# Patient Record
Sex: FEMALE | Race: WHITE | Employment: OTHER | URBAN - METROPOLITAN AREA
[De-identification: names, ages, dates, MRNs, and addresses within clinical notes are randomized per-mention and may not be internally consistent; named-entity substitution may affect disease eponyms.]

---

## 2021-07-31 ENCOUNTER — APPOINTMENT (OUTPATIENT)
Dept: GENERAL RADIOLOGY | Facility: CLINIC | Age: 76
DRG: 308 | End: 2021-07-31
Attending: FAMILY MEDICINE
Payer: COMMERCIAL

## 2021-07-31 ENCOUNTER — HOSPITAL ENCOUNTER (INPATIENT)
Facility: CLINIC | Age: 76
LOS: 2 days | Discharge: HOME | DRG: 308 | End: 2021-08-03
Attending: FAMILY MEDICINE | Admitting: PEDIATRICS
Payer: COMMERCIAL

## 2021-07-31 ENCOUNTER — APPOINTMENT (OUTPATIENT)
Dept: ULTRASOUND IMAGING | Facility: CLINIC | Age: 76
DRG: 308 | End: 2021-07-31
Attending: FAMILY MEDICINE
Payer: COMMERCIAL

## 2021-07-31 DIAGNOSIS — J96.01 ACUTE RESPIRATORY FAILURE WITH HYPOXIA AND HYPERCAPNIA (H): Primary | ICD-10-CM

## 2021-07-31 DIAGNOSIS — J96.21 ACUTE ON CHRONIC RESPIRATORY FAILURE WITH HYPOXIA AND HYPERCAPNIA (H): ICD-10-CM

## 2021-07-31 DIAGNOSIS — J96.02 ACUTE RESPIRATORY FAILURE WITH HYPOXIA AND HYPERCAPNIA (H): Primary | ICD-10-CM

## 2021-07-31 DIAGNOSIS — I89.0 LYMPHEDEMA: ICD-10-CM

## 2021-07-31 DIAGNOSIS — J45.51 SEVERE PERSISTENT ASTHMA WITH (ACUTE) EXACERBATION (H): ICD-10-CM

## 2021-07-31 DIAGNOSIS — G47.30 SLEEP APNEA, UNSPECIFIED TYPE: ICD-10-CM

## 2021-07-31 DIAGNOSIS — J96.22 ACUTE ON CHRONIC RESPIRATORY FAILURE WITH HYPOXIA AND HYPERCAPNIA (H): ICD-10-CM

## 2021-07-31 DIAGNOSIS — Z79.01 ANTICOAGULATED ON COUMADIN: ICD-10-CM

## 2021-07-31 DIAGNOSIS — I48.91 ATRIAL FIBRILLATION WITH RVR (H): ICD-10-CM

## 2021-07-31 DIAGNOSIS — M79.89 SWELLING OF LIMB: ICD-10-CM

## 2021-07-31 DIAGNOSIS — R65.10 SIRS (SYSTEMIC INFLAMMATORY RESPONSE SYNDROME) (H): ICD-10-CM

## 2021-07-31 DIAGNOSIS — J81.0 ACUTE PULMONARY EDEMA (H): ICD-10-CM

## 2021-07-31 DIAGNOSIS — E66.01 MORBID OBESITY (H): ICD-10-CM

## 2021-07-31 DIAGNOSIS — Z11.52 ENCOUNTER FOR SCREENING LABORATORY TESTING FOR SEVERE ACUTE RESPIRATORY SYNDROME CORONAVIRUS 2 (SARS-COV-2): ICD-10-CM

## 2021-07-31 PROBLEM — L03.116 CELLULITIS OF LEFT LOWER LEG: Status: ACTIVE | Noted: 2021-07-31

## 2021-07-31 PROBLEM — R06.03 ACUTE RESPIRATORY DISTRESS: Status: ACTIVE | Noted: 2021-07-31

## 2021-07-31 PROBLEM — L97.929 VENOUS ULCER OF LEFT LEG (H): Status: ACTIVE | Noted: 2021-07-31

## 2021-07-31 PROBLEM — N18.31 STAGE 3A CHRONIC KIDNEY DISEASE (H): Status: ACTIVE | Noted: 2021-07-31

## 2021-07-31 PROBLEM — I83.029 VENOUS ULCER OF LEFT LEG (H): Status: ACTIVE | Noted: 2021-07-31

## 2021-07-31 PROBLEM — E87.4 HYPERCAPNIA WITH MIXED ACID-BASE DISORDER: Status: ACTIVE | Noted: 2021-07-31

## 2021-07-31 PROBLEM — G47.33 OSA ON CPAP: Status: ACTIVE | Noted: 2021-07-31

## 2021-07-31 PROBLEM — R79.89 ELEVATED BRAIN NATRIURETIC PEPTIDE (BNP) LEVEL: Status: ACTIVE | Noted: 2021-07-31

## 2021-07-31 LAB
ALBUMIN SERPL-MCNC: 3.8 G/DL (ref 3.4–5)
ALBUMIN UR-MCNC: 30 MG/DL
ALP SERPL-CCNC: 109 U/L (ref 40–150)
ALT SERPL W P-5'-P-CCNC: 26 U/L (ref 0–50)
ANION GAP SERPL CALCULATED.3IONS-SCNC: 6 MMOL/L (ref 3–14)
APPEARANCE UR: CLEAR
APTT PPP: 39 SECONDS (ref 22–38)
AST SERPL W P-5'-P-CCNC: 18 U/L (ref 0–45)
BACTERIA #/AREA URNS HPF: ABNORMAL /HPF
BASE EXCESS BLDA CALC-SCNC: 3.8 MMOL/L (ref -9–1.8)
BASE EXCESS BLDV CALC-SCNC: 3.1 MMOL/L (ref -7.7–1.9)
BASOPHILS # BLD AUTO: 0 10E3/UL (ref 0–0.2)
BASOPHILS NFR BLD AUTO: 0 %
BILIRUB SERPL-MCNC: 0.8 MG/DL (ref 0.2–1.3)
BILIRUB UR QL STRIP: NEGATIVE
BUN SERPL-MCNC: 18 MG/DL (ref 7–30)
CALCIUM SERPL-MCNC: 8.8 MG/DL (ref 8.5–10.1)
CHLORIDE BLD-SCNC: 106 MMOL/L (ref 94–109)
CO2 SERPL-SCNC: 27 MMOL/L (ref 20–32)
COLOR UR AUTO: YELLOW
CREAT SERPL-MCNC: 1.06 MG/DL (ref 0.52–1.04)
CRP SERPL-MCNC: 7.5 MG/L (ref 0–8)
D DIMER PPP FEU-MCNC: 0.92 UG/ML FEU (ref 0–0.5)
EOSINOPHIL # BLD AUTO: 0.1 10E3/UL (ref 0–0.7)
EOSINOPHIL NFR BLD AUTO: 1 %
ERYTHROCYTE [DISTWIDTH] IN BLOOD BY AUTOMATED COUNT: 15 % (ref 10–15)
GFR SERPL CREATININE-BSD FRML MDRD: 51 ML/MIN/1.73M2
GLUCOSE BLD-MCNC: 112 MG/DL (ref 70–99)
GLUCOSE UR STRIP-MCNC: NEGATIVE MG/DL
HCO3 BLD-SCNC: 28 MMOL/L (ref 21–28)
HCO3 BLDV-SCNC: 30 MMOL/L (ref 21–28)
HCT VFR BLD AUTO: 39.5 % (ref 35–47)
HGB BLD-MCNC: 12.3 G/DL (ref 11.7–15.7)
HGB UR QL STRIP: NEGATIVE
IMM GRANULOCYTES # BLD: 0 10E3/UL
IMM GRANULOCYTES NFR BLD: 0 %
INR PPP: 2.93 (ref 0.85–1.15)
KETONES UR STRIP-MCNC: NEGATIVE MG/DL
LACTATE SERPL-SCNC: 1.3 MMOL/L (ref 0.7–2)
LACTATE SERPL-SCNC: 1.8 MMOL/L (ref 0.7–2)
LEUKOCYTE ESTERASE UR QL STRIP: ABNORMAL
LYMPHOCYTES # BLD AUTO: 0.7 10E3/UL (ref 0.8–5.3)
LYMPHOCYTES NFR BLD AUTO: 8 %
MAGNESIUM SERPL-MCNC: 1.5 MG/DL (ref 1.6–2.3)
MCH RBC QN AUTO: 30.2 PG (ref 26.5–33)
MCHC RBC AUTO-ENTMCNC: 31.1 G/DL (ref 31.5–36.5)
MCV RBC AUTO: 97 FL (ref 78–100)
MONOCYTES # BLD AUTO: 0.5 10E3/UL (ref 0–1.3)
MONOCYTES NFR BLD AUTO: 5 %
MUCOUS THREADS #/AREA URNS LPF: PRESENT /LPF
NEUTROPHILS # BLD AUTO: 7.9 10E3/UL (ref 1.6–8.3)
NEUTROPHILS NFR BLD AUTO: 86 %
NITRATE UR QL: NEGATIVE
NRBC # BLD AUTO: 0 10E3/UL
NRBC BLD AUTO-RTO: 0 /100
NT-PROBNP SERPL-MCNC: 2221 PG/ML (ref 0–900)
O2/TOTAL GAS SETTING VFR VENT: 21 %
O2/TOTAL GAS SETTING VFR VENT: 28 %
OXYHGB MFR BLD: 91 % (ref 92–100)
PCO2 BLD: 42 MM HG (ref 35–45)
PCO2 BLDV: 57 MM HG (ref 40–50)
PH BLD: 7.44 [PH] (ref 7.35–7.45)
PH BLDV: 7.34 [PH] (ref 7.32–7.43)
PH UR STRIP: 7 [PH] (ref 5–7)
PLATELET # BLD AUTO: 187 10E3/UL (ref 150–450)
PO2 BLD: 71 MM HG (ref 80–105)
PO2 BLDV: 25 MM HG (ref 25–47)
POTASSIUM BLD-SCNC: 4 MMOL/L (ref 3.4–5.3)
PROCALCITONIN SERPL-MCNC: <0.05 NG/ML
PROT SERPL-MCNC: 7.1 G/DL (ref 6.8–8.8)
RBC # BLD AUTO: 4.07 10E6/UL (ref 3.8–5.2)
RBC URINE: 1 /HPF
SARS-COV-2 RNA RESP QL NAA+PROBE: NEGATIVE
SODIUM SERPL-SCNC: 139 MMOL/L (ref 133–144)
SP GR UR STRIP: 1.01 (ref 1–1.03)
SQUAMOUS EPITHELIAL: 2 /HPF
TROPONIN I SERPL-MCNC: <0.015 UG/L (ref 0–0.04)
TSH SERPL DL<=0.005 MIU/L-ACNC: 0.96 MU/L (ref 0.4–4)
UROBILINOGEN UR STRIP-MCNC: 2 MG/DL
WBC # BLD AUTO: 9.3 10E3/UL (ref 4–11)
WBC URINE: 6 /HPF

## 2021-07-31 PROCEDURE — 85730 THROMBOPLASTIN TIME PARTIAL: CPT | Performed by: FAMILY MEDICINE

## 2021-07-31 PROCEDURE — 250N000011 HC RX IP 250 OP 636: Performed by: FAMILY MEDICINE

## 2021-07-31 PROCEDURE — 84484 ASSAY OF TROPONIN QUANT: CPT | Performed by: FAMILY MEDICINE

## 2021-07-31 PROCEDURE — C9803 HOPD COVID-19 SPEC COLLECT: HCPCS | Performed by: FAMILY MEDICINE

## 2021-07-31 PROCEDURE — 99220 PR INITIAL OBSERVATION CARE,LEVEL III: CPT | Performed by: PEDIATRICS

## 2021-07-31 PROCEDURE — 87040 BLOOD CULTURE FOR BACTERIA: CPT | Performed by: FAMILY MEDICINE

## 2021-07-31 PROCEDURE — 82803 BLOOD GASES ANY COMBINATION: CPT | Performed by: FAMILY MEDICINE

## 2021-07-31 PROCEDURE — 84443 ASSAY THYROID STIM HORMONE: CPT | Performed by: FAMILY MEDICINE

## 2021-07-31 PROCEDURE — 96365 THER/PROPH/DIAG IV INF INIT: CPT

## 2021-07-31 PROCEDURE — 250N000013 HC RX MED GY IP 250 OP 250 PS 637: Performed by: FAMILY MEDICINE

## 2021-07-31 PROCEDURE — 250N000013 HC RX MED GY IP 250 OP 250 PS 637: Performed by: INTERNAL MEDICINE

## 2021-07-31 PROCEDURE — 94640 AIRWAY INHALATION TREATMENT: CPT

## 2021-07-31 PROCEDURE — 81001 URINALYSIS AUTO W/SCOPE: CPT | Performed by: FAMILY MEDICINE

## 2021-07-31 PROCEDURE — 250N000009 HC RX 250: Performed by: PEDIATRICS

## 2021-07-31 PROCEDURE — 84145 PROCALCITONIN (PCT): CPT | Performed by: PEDIATRICS

## 2021-07-31 PROCEDURE — 83735 ASSAY OF MAGNESIUM: CPT | Performed by: PEDIATRICS

## 2021-07-31 PROCEDURE — 250N000009 HC RX 250

## 2021-07-31 PROCEDURE — 93970 EXTREMITY STUDY: CPT

## 2021-07-31 PROCEDURE — 36415 COLL VENOUS BLD VENIPUNCTURE: CPT | Performed by: PEDIATRICS

## 2021-07-31 PROCEDURE — 96375 TX/PRO/DX INJ NEW DRUG ADDON: CPT | Performed by: FAMILY MEDICINE

## 2021-07-31 PROCEDURE — 93005 ELECTROCARDIOGRAM TRACING: CPT | Performed by: FAMILY MEDICINE

## 2021-07-31 PROCEDURE — 82805 BLOOD GASES W/O2 SATURATION: CPT | Performed by: PEDIATRICS

## 2021-07-31 PROCEDURE — 85379 FIBRIN DEGRADATION QUANT: CPT | Performed by: FAMILY MEDICINE

## 2021-07-31 PROCEDURE — 87635 SARS-COV-2 COVID-19 AMP PRB: CPT | Performed by: FAMILY MEDICINE

## 2021-07-31 PROCEDURE — G0378 HOSPITAL OBSERVATION PER HR: HCPCS

## 2021-07-31 PROCEDURE — 96374 THER/PROPH/DIAG INJ IV PUSH: CPT | Performed by: FAMILY MEDICINE

## 2021-07-31 PROCEDURE — 85025 COMPLETE CBC W/AUTO DIFF WBC: CPT | Performed by: FAMILY MEDICINE

## 2021-07-31 PROCEDURE — 36415 COLL VENOUS BLD VENIPUNCTURE: CPT | Performed by: FAMILY MEDICINE

## 2021-07-31 PROCEDURE — 85610 PROTHROMBIN TIME: CPT | Performed by: FAMILY MEDICINE

## 2021-07-31 PROCEDURE — 93010 ELECTROCARDIOGRAM REPORT: CPT | Performed by: FAMILY MEDICINE

## 2021-07-31 PROCEDURE — 250N000009 HC RX 250: Performed by: FAMILY MEDICINE

## 2021-07-31 PROCEDURE — 83880 ASSAY OF NATRIURETIC PEPTIDE: CPT | Performed by: FAMILY MEDICINE

## 2021-07-31 PROCEDURE — 83605 ASSAY OF LACTIC ACID: CPT | Performed by: FAMILY MEDICINE

## 2021-07-31 PROCEDURE — 250N000011 HC RX IP 250 OP 636: Performed by: INTERNAL MEDICINE

## 2021-07-31 PROCEDURE — 99285 EMERGENCY DEPT VISIT HI MDM: CPT | Mod: 25 | Performed by: FAMILY MEDICINE

## 2021-07-31 PROCEDURE — 71045 X-RAY EXAM CHEST 1 VIEW: CPT

## 2021-07-31 PROCEDURE — 96366 THER/PROPH/DIAG IV INF ADDON: CPT

## 2021-07-31 PROCEDURE — 86140 C-REACTIVE PROTEIN: CPT | Performed by: FAMILY MEDICINE

## 2021-07-31 PROCEDURE — 80053 COMPREHEN METABOLIC PANEL: CPT | Performed by: FAMILY MEDICINE

## 2021-07-31 PROCEDURE — 83605 ASSAY OF LACTIC ACID: CPT | Performed by: PEDIATRICS

## 2021-07-31 PROCEDURE — 250N000013 HC RX MED GY IP 250 OP 250 PS 637: Performed by: PEDIATRICS

## 2021-07-31 RX ORDER — SENNOSIDES 8.6 MG
2 TABLET ORAL DAILY PRN
COMMUNITY

## 2021-07-31 RX ORDER — FUROSEMIDE 10 MG/ML
60 INJECTION INTRAMUSCULAR; INTRAVENOUS ONCE
Status: COMPLETED | OUTPATIENT
Start: 2021-07-31 | End: 2021-07-31

## 2021-07-31 RX ORDER — METOPROLOL TARTRATE 1 MG/ML
5 INJECTION, SOLUTION INTRAVENOUS EVERY 5 MIN PRN
Status: COMPLETED | OUTPATIENT
Start: 2021-07-31 | End: 2021-07-31

## 2021-07-31 RX ORDER — NALOXONE HYDROCHLORIDE 0.4 MG/ML
0.4 INJECTION, SOLUTION INTRAMUSCULAR; INTRAVENOUS; SUBCUTANEOUS
Status: DISCONTINUED | OUTPATIENT
Start: 2021-07-31 | End: 2021-08-03 | Stop reason: HOSPADM

## 2021-07-31 RX ORDER — NALOXONE HYDROCHLORIDE 0.4 MG/ML
0.2 INJECTION, SOLUTION INTRAMUSCULAR; INTRAVENOUS; SUBCUTANEOUS
Status: DISCONTINUED | OUTPATIENT
Start: 2021-07-31 | End: 2021-08-03 | Stop reason: HOSPADM

## 2021-07-31 RX ORDER — ROPINIROLE 3 MG/1
3 TABLET, FILM COATED ORAL AT BEDTIME
COMMUNITY

## 2021-07-31 RX ORDER — ONDANSETRON 4 MG/1
4 TABLET, ORALLY DISINTEGRATING ORAL EVERY 6 HOURS PRN
Status: DISCONTINUED | OUTPATIENT
Start: 2021-07-31 | End: 2021-08-03 | Stop reason: HOSPADM

## 2021-07-31 RX ORDER — ASPIRIN 81 MG/1
81 TABLET ORAL DAILY
COMMUNITY

## 2021-07-31 RX ORDER — WARFARIN SODIUM 2 MG/1
2 TABLET ORAL
Status: COMPLETED | OUTPATIENT
Start: 2021-07-31 | End: 2021-07-31

## 2021-07-31 RX ORDER — DILTIAZEM HCL IN NACL,ISO-OSM 125 MG/125
5-15 PLASTIC BAG, INJECTION (ML) INTRAVENOUS CONTINUOUS
Status: DISCONTINUED | OUTPATIENT
Start: 2021-07-31 | End: 2021-08-01

## 2021-07-31 RX ORDER — DILTIAZEM HYDROCHLORIDE 5 MG/ML
INJECTION INTRAVENOUS
Status: COMPLETED
Start: 2021-07-31 | End: 2021-07-31

## 2021-07-31 RX ORDER — CYANOCOBALAMIN (VITAMIN B-12) 2500 MCG
2500 TABLET, SUBLINGUAL SUBLINGUAL DAILY
COMMUNITY

## 2021-07-31 RX ORDER — LIDOCAINE 40 MG/G
CREAM TOPICAL
Status: DISCONTINUED | OUTPATIENT
Start: 2021-07-31 | End: 2021-08-03 | Stop reason: HOSPADM

## 2021-07-31 RX ORDER — ROPINIROLE 0.25 MG/1
3 TABLET, FILM COATED ORAL AT BEDTIME
Status: DISCONTINUED | OUTPATIENT
Start: 2021-07-31 | End: 2021-08-03 | Stop reason: HOSPADM

## 2021-07-31 RX ORDER — METHYLPREDNISOLONE SODIUM SUCCINATE 125 MG/2ML
125 INJECTION, POWDER, LYOPHILIZED, FOR SOLUTION INTRAMUSCULAR; INTRAVENOUS ONCE
Status: COMPLETED | OUTPATIENT
Start: 2021-07-31 | End: 2021-07-31

## 2021-07-31 RX ORDER — ATORVASTATIN CALCIUM 40 MG/1
40 TABLET, FILM COATED ORAL DAILY
COMMUNITY

## 2021-07-31 RX ORDER — HYDROMORPHONE HYDROCHLORIDE 1 MG/ML
0.5 INJECTION, SOLUTION INTRAMUSCULAR; INTRAVENOUS; SUBCUTANEOUS
Status: DISCONTINUED | OUTPATIENT
Start: 2021-07-31 | End: 2021-07-31

## 2021-07-31 RX ORDER — MORPHINE SULFATE 2 MG/ML
2 INJECTION, SOLUTION INTRAMUSCULAR; INTRAVENOUS EVERY 4 HOURS PRN
Status: DISCONTINUED | OUTPATIENT
Start: 2021-07-31 | End: 2021-08-03 | Stop reason: HOSPADM

## 2021-07-31 RX ORDER — DILTIAZEM HYDROCHLORIDE 5 MG/ML
20 INJECTION INTRAVENOUS ONCE
Status: COMPLETED | OUTPATIENT
Start: 2021-07-31 | End: 2021-07-31

## 2021-07-31 RX ORDER — ASPIRIN 81 MG/1
81 TABLET ORAL DAILY
Status: DISCONTINUED | OUTPATIENT
Start: 2021-07-31 | End: 2021-08-03 | Stop reason: HOSPADM

## 2021-07-31 RX ORDER — METOPROLOL TARTRATE 50 MG
50 TABLET ORAL ONCE
Status: COMPLETED | OUTPATIENT
Start: 2021-07-31 | End: 2021-07-31

## 2021-07-31 RX ORDER — ALBUTEROL SULFATE 0.83 MG/ML
3 SOLUTION RESPIRATORY (INHALATION)
Status: DISCONTINUED | OUTPATIENT
Start: 2021-07-31 | End: 2021-08-02

## 2021-07-31 RX ORDER — HYDROCODONE BITARTRATE AND ACETAMINOPHEN 5; 325 MG/1; MG/1
1 TABLET ORAL EVERY 6 HOURS PRN
Status: DISCONTINUED | OUTPATIENT
Start: 2021-07-31 | End: 2021-08-03 | Stop reason: HOSPADM

## 2021-07-31 RX ORDER — METOPROLOL TARTRATE 50 MG
50 TABLET ORAL 2 TIMES DAILY
Status: DISCONTINUED | OUTPATIENT
Start: 2021-07-31 | End: 2021-08-03 | Stop reason: HOSPADM

## 2021-07-31 RX ORDER — IPRATROPIUM BROMIDE AND ALBUTEROL SULFATE 2.5; .5 MG/3ML; MG/3ML
3 SOLUTION RESPIRATORY (INHALATION) ONCE
Status: COMPLETED | OUTPATIENT
Start: 2021-07-31 | End: 2021-07-31

## 2021-07-31 RX ORDER — PREDNISONE 20 MG/1
40 TABLET ORAL
Status: DISCONTINUED | OUTPATIENT
Start: 2021-08-02 | End: 2021-08-03 | Stop reason: HOSPADM

## 2021-07-31 RX ORDER — LISINOPRIL 40 MG/1
40 TABLET ORAL DAILY
Status: ON HOLD | COMMUNITY
End: 2021-08-03

## 2021-07-31 RX ORDER — CEFAZOLIN SODIUM 1 G/3ML
1 INJECTION, POWDER, FOR SOLUTION INTRAMUSCULAR; INTRAVENOUS EVERY 8 HOURS
Status: DISCONTINUED | OUTPATIENT
Start: 2021-07-31 | End: 2021-08-02

## 2021-07-31 RX ORDER — ACETAMINOPHEN 325 MG/1
325 TABLET ORAL EVERY 4 HOURS PRN
Status: DISCONTINUED | OUTPATIENT
Start: 2021-07-31 | End: 2021-08-03 | Stop reason: HOSPADM

## 2021-07-31 RX ORDER — IPRATROPIUM BROMIDE AND ALBUTEROL SULFATE 2.5; .5 MG/3ML; MG/3ML
3 SOLUTION RESPIRATORY (INHALATION) EVERY 4 HOURS
Status: DISCONTINUED | OUTPATIENT
Start: 2021-07-31 | End: 2021-08-01

## 2021-07-31 RX ORDER — FLUTICASONE PROPIONATE 50 MCG
2 SPRAY, SUSPENSION (ML) NASAL DAILY
COMMUNITY

## 2021-07-31 RX ORDER — FUROSEMIDE 40 MG
40 TABLET ORAL DAILY
Status: ON HOLD | COMMUNITY
End: 2021-08-03

## 2021-07-31 RX ORDER — DILTIAZEM HYDROCHLORIDE 30 MG/1
30 TABLET, FILM COATED ORAL ONCE
Status: COMPLETED | OUTPATIENT
Start: 2021-07-31 | End: 2021-07-31

## 2021-07-31 RX ORDER — WARFARIN SODIUM 5 MG/1
5 TABLET ORAL DAILY
Status: ON HOLD | COMMUNITY
End: 2021-08-03

## 2021-07-31 RX ORDER — AMLODIPINE BESYLATE 5 MG/1
5 TABLET ORAL DAILY
Status: ON HOLD | COMMUNITY
End: 2021-08-03

## 2021-07-31 RX ORDER — PANTOPRAZOLE SODIUM 40 MG/1
40 TABLET, DELAYED RELEASE ORAL DAILY
Status: DISCONTINUED | OUTPATIENT
Start: 2021-07-31 | End: 2021-08-03 | Stop reason: HOSPADM

## 2021-07-31 RX ORDER — ONDANSETRON 2 MG/ML
4 INJECTION INTRAMUSCULAR; INTRAVENOUS EVERY 6 HOURS PRN
Status: DISCONTINUED | OUTPATIENT
Start: 2021-07-31 | End: 2021-08-03 | Stop reason: HOSPADM

## 2021-07-31 RX ORDER — LISINOPRIL 40 MG/1
40 TABLET ORAL DAILY
Status: DISCONTINUED | OUTPATIENT
Start: 2021-07-31 | End: 2021-08-02

## 2021-07-31 RX ORDER — DILTIAZEM HYDROCHLORIDE 30 MG/1
30 TABLET, FILM COATED ORAL EVERY 6 HOURS PRN
Status: ON HOLD | COMMUNITY
End: 2021-08-03

## 2021-07-31 RX ORDER — METHYLPREDNISOLONE SODIUM SUCCINATE 40 MG/ML
40 INJECTION, POWDER, LYOPHILIZED, FOR SOLUTION INTRAMUSCULAR; INTRAVENOUS EVERY 8 HOURS
Status: COMPLETED | OUTPATIENT
Start: 2021-08-01 | End: 2021-08-01

## 2021-07-31 RX ORDER — MULTIVITAMIN,THERAPEUTIC
1 TABLET ORAL DAILY
COMMUNITY

## 2021-07-31 RX ORDER — IPRATROPIUM BROMIDE AND ALBUTEROL SULFATE 2.5; .5 MG/3ML; MG/3ML
3 SOLUTION RESPIRATORY (INHALATION) ONCE
Status: DISCONTINUED | OUTPATIENT
Start: 2021-07-31 | End: 2021-07-31

## 2021-07-31 RX ORDER — ATORVASTATIN CALCIUM 40 MG/1
40 TABLET, FILM COATED ORAL DAILY
Status: DISCONTINUED | OUTPATIENT
Start: 2021-07-31 | End: 2021-08-03 | Stop reason: HOSPADM

## 2021-07-31 RX ORDER — METOPROLOL TARTRATE 50 MG
50 TABLET ORAL 2 TIMES DAILY
COMMUNITY

## 2021-07-31 RX ADMIN — METOPROLOL TARTRATE 50 MG: 50 TABLET, FILM COATED ORAL at 15:27

## 2021-07-31 RX ADMIN — HYDROCODONE BITARTRATE AND ACETAMINOPHEN 1 TABLET: 5; 325 TABLET ORAL at 20:03

## 2021-07-31 RX ADMIN — CEFAZOLIN 1 G: 1 INJECTION, POWDER, FOR SOLUTION INTRAMUSCULAR; INTRAVENOUS at 21:25

## 2021-07-31 RX ADMIN — FUROSEMIDE 60 MG: 10 INJECTION, SOLUTION INTRAVENOUS at 13:10

## 2021-07-31 RX ADMIN — ASPIRIN 81 MG: 81 TABLET ORAL at 18:54

## 2021-07-31 RX ADMIN — Medication 10 MG/HR: at 18:54

## 2021-07-31 RX ADMIN — METHYLPREDNISOLONE SODIUM SUCCINATE 125 MG: 125 INJECTION, POWDER, FOR SOLUTION INTRAMUSCULAR; INTRAVENOUS at 16:23

## 2021-07-31 RX ADMIN — ROPINIROLE HYDROCHLORIDE 3 MG: 0.25 TABLET, FILM COATED ORAL at 20:36

## 2021-07-31 RX ADMIN — DILTIAZEM HYDROCHLORIDE 20 MG: 5 INJECTION, SOLUTION INTRAVENOUS at 13:38

## 2021-07-31 RX ADMIN — METOPROLOL TARTRATE 5 MG: 1 INJECTION, SOLUTION INTRAVENOUS at 15:05

## 2021-07-31 RX ADMIN — IPRATROPIUM BROMIDE AND ALBUTEROL SULFATE 3 ML: .5; 3 SOLUTION RESPIRATORY (INHALATION) at 12:51

## 2021-07-31 RX ADMIN — WARFARIN SODIUM 2 MG: 2 TABLET ORAL at 18:54

## 2021-07-31 RX ADMIN — IPRATROPIUM BROMIDE AND ALBUTEROL SULFATE 3 ML: .5; 3 SOLUTION RESPIRATORY (INHALATION) at 16:02

## 2021-07-31 RX ADMIN — METOPROLOL TARTRATE 5 MG: 1 INJECTION, SOLUTION INTRAVENOUS at 15:00

## 2021-07-31 RX ADMIN — METOPROLOL TARTRATE 50 MG: 50 TABLET, FILM COATED ORAL at 20:34

## 2021-07-31 RX ADMIN — ATORVASTATIN CALCIUM 40 MG: 40 TABLET, FILM COATED ORAL at 18:54

## 2021-07-31 RX ADMIN — IPRATROPIUM BROMIDE AND ALBUTEROL SULFATE 3 ML: .5; 3 SOLUTION RESPIRATORY (INHALATION) at 20:34

## 2021-07-31 RX ADMIN — PANTOPRAZOLE SODIUM 40 MG: 40 TABLET, DELAYED RELEASE ORAL at 18:54

## 2021-07-31 RX ADMIN — LISINOPRIL 40 MG: 10 TABLET ORAL at 18:54

## 2021-07-31 RX ADMIN — DILTIAZEM HYDROCHLORIDE 30 MG: 30 TABLET, FILM COATED ORAL at 13:47

## 2021-07-31 RX ADMIN — DILTIAZEM HYDROCHLORIDE 20 MG: 5 INJECTION INTRAVENOUS at 13:38

## 2021-07-31 RX ADMIN — HYDROMORPHONE HYDROCHLORIDE 0.5 MG: 1 INJECTION, SOLUTION INTRAMUSCULAR; INTRAVENOUS; SUBCUTANEOUS at 16:56

## 2021-07-31 RX ADMIN — METOPROLOL TARTRATE 5 MG: 1 INJECTION, SOLUTION INTRAVENOUS at 15:12

## 2021-07-31 ASSESSMENT — MIFFLIN-ST. JEOR: SCORE: 1832.63

## 2021-07-31 NOTE — PHARMACY-ANTICOAGULATION SERVICE
Clinical Pharmacy - Warfarin Dosing Consult     Pharmacy has been consulted to manage this patient s warfarin therapy.  Indication: Atrial Fibrillation  Therapy Goal: INR 2-3  Provider/Team: Yoshi FONG [facility]: [place]  Warfarin Prior to Admission: Yes  Warfarin PTA Regimen: 5 mg Monday, Wednesday, Friday; 2.5 mg All other days of the week  Recent documented change in oral intake/nutrition: Unknown    INR   Date Value Ref Range Status   07/31/2021 2.93 (H) 0.85 - 1.15 Final     Comment:     Effective 7/11/2021, the reference range for this assay has changed.     Noted that BNP is 2221 today.  Anticipate that this may make her more sensitive to warfarin for the short term.  Will provide reduced warfarin dose (20% reduction)  today given elevated BNP and INR at/near upper end of target range.  Recommend warfarin 2 mg today.  Pharmacy will monitor Nanette Edwards daily and order warfarin doses to achieve specified goal.      Please contact pharmacy as soon as possible if the warfarin needs to be held for a procedure or if the warfarin goals change.      Casey Baum [facility],PharmD.........July 31, 2021 6:18 PM
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
## 2021-08-01 PROBLEM — D68.9 COAGULATION DEFECT (H): Status: ACTIVE | Noted: 2021-08-01

## 2021-08-01 PROBLEM — M79.89 SWELLING OF LIMB: Status: ACTIVE | Noted: 2021-08-01

## 2021-08-01 PROBLEM — D69.1 PLATELET FUNCTION DEFECT (H): Status: ACTIVE | Noted: 2021-08-01

## 2021-08-01 PROBLEM — S80.12XA HEMATOMA OF LEFT LOWER LEG: Status: ACTIVE | Noted: 2021-08-01

## 2021-08-01 PROBLEM — J96.02 ACUTE RESPIRATORY FAILURE WITH HYPOXIA AND HYPERCAPNIA (H): Status: ACTIVE | Noted: 2021-07-31

## 2021-08-01 PROBLEM — T38.0X5A STEROID-INDUCED HYPERGLYCEMIA: Status: ACTIVE | Noted: 2021-08-01

## 2021-08-01 PROBLEM — J96.01 ACUTE RESPIRATORY FAILURE WITH HYPOXIA AND HYPERCAPNIA (H): Status: ACTIVE | Noted: 2021-07-31

## 2021-08-01 PROBLEM — E83.42 HYPOMAGNESEMIA: Status: ACTIVE | Noted: 2021-08-01

## 2021-08-01 PROBLEM — R65.10 SIRS (SYSTEMIC INFLAMMATORY RESPONSE SYNDROME) (H): Status: ACTIVE | Noted: 2021-08-01

## 2021-08-01 PROBLEM — R73.9 STEROID-INDUCED HYPERGLYCEMIA: Status: ACTIVE | Noted: 2021-08-01

## 2021-08-01 PROBLEM — Z11.52 ENCOUNTER FOR SCREENING LABORATORY TESTING FOR SEVERE ACUTE RESPIRATORY SYNDROME CORONAVIRUS 2 (SARS-COV-2): Status: ACTIVE | Noted: 2021-08-01

## 2021-08-01 LAB
ANION GAP SERPL CALCULATED.3IONS-SCNC: 8 MMOL/L (ref 3–14)
BUN SERPL-MCNC: 26 MG/DL (ref 7–30)
CALCIUM SERPL-MCNC: 8.8 MG/DL (ref 8.5–10.1)
CHLORIDE BLD-SCNC: 101 MMOL/L (ref 94–109)
CO2 SERPL-SCNC: 25 MMOL/L (ref 20–32)
CREAT SERPL-MCNC: 1.2 MG/DL (ref 0.52–1.04)
CRP SERPL-MCNC: 122 MG/L (ref 0–8)
GFR SERPL CREATININE-BSD FRML MDRD: 44 ML/MIN/1.73M2
GLUCOSE BLD-MCNC: 242 MG/DL (ref 70–99)
INR PPP: 3.55 (ref 0.85–1.15)
MAGNESIUM SERPL-MCNC: 1.7 MG/DL (ref 1.6–2.3)
POTASSIUM BLD-SCNC: 3.7 MMOL/L (ref 3.4–5.3)
PROCALCITONIN SERPL-MCNC: 0.75 NG/ML
SODIUM SERPL-SCNC: 134 MMOL/L (ref 133–144)

## 2021-08-01 PROCEDURE — 99207 PR CDG-MDM COMPONENT: MEETS HIGH - UP CODED: CPT | Performed by: PEDIATRICS

## 2021-08-01 PROCEDURE — 250N000009 HC RX 250

## 2021-08-01 PROCEDURE — 84145 PROCALCITONIN (PCT): CPT | Performed by: PEDIATRICS

## 2021-08-01 PROCEDURE — 120N000001 HC R&B MED SURG/OB

## 2021-08-01 PROCEDURE — G0378 HOSPITAL OBSERVATION PER HR: HCPCS

## 2021-08-01 PROCEDURE — 99233 SBSQ HOSP IP/OBS HIGH 50: CPT | Performed by: PEDIATRICS

## 2021-08-01 PROCEDURE — 83735 ASSAY OF MAGNESIUM: CPT | Performed by: PEDIATRICS

## 2021-08-01 PROCEDURE — 86140 C-REACTIVE PROTEIN: CPT | Performed by: PEDIATRICS

## 2021-08-01 PROCEDURE — 94640 AIRWAY INHALATION TREATMENT: CPT

## 2021-08-01 PROCEDURE — 36415 COLL VENOUS BLD VENIPUNCTURE: CPT | Performed by: PEDIATRICS

## 2021-08-01 PROCEDURE — 250N000009 HC RX 250: Performed by: PEDIATRICS

## 2021-08-01 PROCEDURE — 250N000011 HC RX IP 250 OP 636: Performed by: PEDIATRICS

## 2021-08-01 PROCEDURE — 250N000013 HC RX MED GY IP 250 OP 250 PS 637: Performed by: PEDIATRICS

## 2021-08-01 PROCEDURE — 250N000011 HC RX IP 250 OP 636: Performed by: INTERNAL MEDICINE

## 2021-08-01 PROCEDURE — 250N000013 HC RX MED GY IP 250 OP 250 PS 637: Performed by: INTERNAL MEDICINE

## 2021-08-01 PROCEDURE — 85610 PROTHROMBIN TIME: CPT | Performed by: PEDIATRICS

## 2021-08-01 PROCEDURE — 96375 TX/PRO/DX INJ NEW DRUG ADDON: CPT

## 2021-08-01 PROCEDURE — 96376 TX/PRO/DX INJ SAME DRUG ADON: CPT

## 2021-08-01 PROCEDURE — 80048 BASIC METABOLIC PNL TOTAL CA: CPT | Performed by: PEDIATRICS

## 2021-08-01 RX ORDER — IPRATROPIUM BROMIDE AND ALBUTEROL SULFATE 2.5; .5 MG/3ML; MG/3ML
3 SOLUTION RESPIRATORY (INHALATION)
Status: DISCONTINUED | OUTPATIENT
Start: 2021-08-01 | End: 2021-08-02

## 2021-08-01 RX ORDER — DILTIAZEM HYDROCHLORIDE 30 MG/1
30 TABLET, FILM COATED ORAL EVERY 6 HOURS SCHEDULED
Status: DISCONTINUED | OUTPATIENT
Start: 2021-08-01 | End: 2021-08-01

## 2021-08-01 RX ORDER — FUROSEMIDE 40 MG
40 TABLET ORAL DAILY
Status: DISCONTINUED | OUTPATIENT
Start: 2021-08-01 | End: 2021-08-02

## 2021-08-01 RX ORDER — MAGNESIUM OXIDE 400 MG/1
400 TABLET ORAL 2 TIMES DAILY
Status: COMPLETED | OUTPATIENT
Start: 2021-08-01 | End: 2021-08-03

## 2021-08-01 RX ORDER — DILTIAZEM HYDROCHLORIDE 30 MG/1
60 TABLET, FILM COATED ORAL 4 TIMES DAILY
Status: DISCONTINUED | OUTPATIENT
Start: 2021-08-01 | End: 2021-08-02

## 2021-08-01 RX ADMIN — Medication 400 MG: at 20:04

## 2021-08-01 RX ADMIN — IPRATROPIUM BROMIDE AND ALBUTEROL SULFATE 3 ML: .5; 3 SOLUTION RESPIRATORY (INHALATION) at 18:24

## 2021-08-01 RX ADMIN — ASPIRIN 81 MG: 81 TABLET ORAL at 08:56

## 2021-08-01 RX ADMIN — DILTIAZEM HYDROCHLORIDE 30 MG: 30 TABLET, FILM COATED ORAL at 10:23

## 2021-08-01 RX ADMIN — HYDROCODONE BITARTRATE AND ACETAMINOPHEN 1 TABLET: 5; 325 TABLET ORAL at 03:44

## 2021-08-01 RX ADMIN — ATORVASTATIN CALCIUM 40 MG: 40 TABLET, FILM COATED ORAL at 08:56

## 2021-08-01 RX ADMIN — METHYLPREDNISOLONE SODIUM SUCCINATE 40 MG: 40 INJECTION, POWDER, FOR SOLUTION INTRAMUSCULAR; INTRAVENOUS at 00:40

## 2021-08-01 RX ADMIN — ROPINIROLE HYDROCHLORIDE 3 MG: 0.25 TABLET, FILM COATED ORAL at 22:23

## 2021-08-01 RX ADMIN — LISINOPRIL 40 MG: 10 TABLET ORAL at 08:56

## 2021-08-01 RX ADMIN — DILTIAZEM HYDROCHLORIDE 60 MG: 30 TABLET, FILM COATED ORAL at 19:46

## 2021-08-01 RX ADMIN — METOPROLOL TARTRATE 50 MG: 50 TABLET, FILM COATED ORAL at 08:56

## 2021-08-01 RX ADMIN — IPRATROPIUM BROMIDE AND ALBUTEROL SULFATE 3 ML: .5; 3 SOLUTION RESPIRATORY (INHALATION) at 13:27

## 2021-08-01 RX ADMIN — CEFAZOLIN 1 G: 1 INJECTION, POWDER, FOR SOLUTION INTRAMUSCULAR; INTRAVENOUS at 22:23

## 2021-08-01 RX ADMIN — IPRATROPIUM BROMIDE AND ALBUTEROL SULFATE 3 ML: .5; 3 SOLUTION RESPIRATORY (INHALATION) at 08:56

## 2021-08-01 RX ADMIN — IPRATROPIUM BROMIDE AND ALBUTEROL SULFATE 3 ML: .5; 3 SOLUTION RESPIRATORY (INHALATION) at 00:41

## 2021-08-01 RX ADMIN — CEFAZOLIN 1 G: 1 INJECTION, POWDER, FOR SOLUTION INTRAMUSCULAR; INTRAVENOUS at 13:27

## 2021-08-01 RX ADMIN — FLUTICASONE FUROATE AND VILANTEROL TRIFENATATE 1 PUFF: 100; 25 POWDER RESPIRATORY (INHALATION) at 08:56

## 2021-08-01 RX ADMIN — PANTOPRAZOLE SODIUM 40 MG: 40 TABLET, DELAYED RELEASE ORAL at 08:56

## 2021-08-01 RX ADMIN — CEFAZOLIN 1 G: 1 INJECTION, POWDER, FOR SOLUTION INTRAMUSCULAR; INTRAVENOUS at 05:51

## 2021-08-01 RX ADMIN — METHYLPREDNISOLONE SODIUM SUCCINATE 40 MG: 40 INJECTION, POWDER, FOR SOLUTION INTRAMUSCULAR; INTRAVENOUS at 23:38

## 2021-08-01 RX ADMIN — METHYLPREDNISOLONE SODIUM SUCCINATE 40 MG: 40 INJECTION, POWDER, FOR SOLUTION INTRAMUSCULAR; INTRAVENOUS at 16:37

## 2021-08-01 RX ADMIN — IPRATROPIUM BROMIDE AND ALBUTEROL SULFATE 3 ML: .5; 3 SOLUTION RESPIRATORY (INHALATION) at 03:44

## 2021-08-01 RX ADMIN — FUROSEMIDE 40 MG: 40 TABLET ORAL at 10:11

## 2021-08-01 RX ADMIN — METOPROLOL TARTRATE 50 MG: 50 TABLET, FILM COATED ORAL at 20:03

## 2021-08-01 RX ADMIN — METHYLPREDNISOLONE SODIUM SUCCINATE 40 MG: 40 INJECTION, POWDER, FOR SOLUTION INTRAMUSCULAR; INTRAVENOUS at 08:56

## 2021-08-01 RX ADMIN — IPRATROPIUM BROMIDE AND ALBUTEROL SULFATE 3 ML: .5; 3 SOLUTION RESPIRATORY (INHALATION) at 23:38

## 2021-08-01 ASSESSMENT — ACTIVITIES OF DAILY LIVING (ADL)
ADLS_ACUITY_SCORE: 17
WALKING_OR_CLIMBING_STAIRS_DIFFICULTY: YES
DOING_ERRANDS_INDEPENDENTLY_DIFFICULTY: NO
WEAR_GLASSES_OR_BLIND: YES
WALKING_OR_CLIMBING_STAIRS: STAIR CLIMBING DIFFICULTY, REQUIRES EQUIPMENT
DRESSING/BATHING_DIFFICULTY: NO
DIFFICULTY_EATING/SWALLOWING: NO
VISION_MANAGEMENT: GLASSES
TOILETING_ISSUES: NO
CONCENTRATING,_REMEMBERING_OR_MAKING_DECISIONS_DIFFICULTY: NO
FALL_HISTORY_WITHIN_LAST_SIX_MONTHS: NO
ADLS_ACUITY_SCORE: 15
EQUIPMENT_CURRENTLY_USED_AT_HOME: CANE, STRAIGHT
HEARING_DIFFICULTY_OR_DEAF: NO
ADLS_ACUITY_SCORE: 15
DIFFICULTY_COMMUNICATING: NO

## 2021-08-01 ASSESSMENT — MIFFLIN-ST. JEOR: SCORE: 1846.24

## 2021-08-01 NOTE — PROGRESS NOTES
[facility]    Medicine Progress Note - Hospitalist Service       Date of Admission:  7/31/2021    Assessment & Plan         Nanette Edwards is a 76 year old female hospitalized yesterday due to concerns for acute respiratory distress, asthma exacerbation, rapid atrial fibrillation, and venous stasis with skin ulcerations and and acute inflammatory process in the left lower leg.  She deteriorated yesterday with worsening hypoxia necessitating oxygen supplementation and onset of fever concerning for systemic inflammatory response syndrome.  She continues to demonstrate signs of asthma exacerbation and rapid atrial fibrillation.  Atrial fibrillation may be exacerbated by hypomagnesemia.  Clinical appearance of left lower leg is more suspicious today for hematoma than infection.  She takes warfarin chronically which causes coagulation defect and aspirin chronically which causes platelet function defect both of which can be associated with bleeding and likely contribute to risk for this hematoma.  Tachycardia and tachypnea concerning for systemic inflammatory response syndrome may have been due to asthma exacerbation although asthma exacerbation would not explain fever.  Fever could also have contributed to tachycardia and tachypnea.  She is at high risk for an adverse outcome including worsening respiratory failure and systemic inflammatory response syndrome and associated consequences, so ongoing hospitalization is considered medically necessary.  Based on the presently available information, hospitalization for at least 2 midnights is anticipated.    Principal Problem:    Severe persistent asthma with (acute) exacerbation  Active Problems:    Acute respiratory failure with hypoxia and hypercapnia (H)    Atrial fibrillation with RVR (H)    Elevated brain natriuretic peptide (BNP) level    Cellulitis of left lower leg    Stage 3a chronic kidney disease    SIRS (systemic inflammatory  response syndrome) (H)-fever, tachycardia, tachypnea    Hematoma of left lower leg    Hypomagnesemia    Coagulation defect (H)-chronic warfarin    Platelet function defect (H)-chronic ASA    Swelling of limb    Encounter for screening laboratory testing for severe acute respiratory syndrome coronavirus 2 (SARS-CoV-2)    Morbid obesity (H)    Lymphedema    Anticoagulated on Coumadin    VIKTOR on CPAP    Hypercapnia with mixed acid-base disorder    Venous ulcer of left leg (H)    Admit to inpatient  Continue current treatments for asthma exacerbation including corticosteroids and bronchodilators, de-escalate frequency of bronchodilators as asthma improves because they likely exacerbate rapid atrial fibrillation  Start scheduled oral diltiazem today and adjust dosing depending upon clinical response, continue chronic metoprolol, continue chronic warfarin with pharmacy assisting with dosing warfarin during hospitalization  Continue Ancef for now, recheck CRP and procalcitonin, anticipate transition to short course of empiric Keflex once improved  Monitor oxygenation and titrate or wean oxygen supplementation to keep saturations 90% and higher  May start to advance diet and activity as tolerated but would avoid increasing activity if she develops worsening rapid atrial fibrillation  Continue home CPAP during sleep with or without oxygen supplementation depending upon oxygen saturations and her ability to tolerate oxygen supplementation if indicated  Add magnesium supplementation per protocol  Resume chronic dose of oral Lasix today  Because scheduled diltiazem is being started today, will not resume chronic amlodipine at this time       Diet: 2 Gram Sodium Diet    DVT Prophylaxis: Warfarin  Lyle Catheter: Not present  Central Lines: None  Code Status: Full Code      Disposition Plan   Expected discharge: Unknown recommended to prior living arrangement once Medically stable.     The patient's care was discussed with the  Bedside Nurse, Care Coordinator/, Patient and Patient's spouse.    Carlos Enrique Ramos MD  Hospitalist Service  [facility]  Securely message with the Vocera Web Console (learn more here)  Text page via [employer] Paging/Directory      Clinically Significant Risk Factors Present on Admission           # Hypomagnesemia: Mg = 1.5 mg/dL (Ref range: 1.6 - 2.3 mg/dL) on admission, will replace as needed   # Coagulation Defect: home medication list includes an anticoagulant medication  # Platelet Defect: home medication list includes an antiplatelet medication      ______________________________________________________________________    Interval History   Patient worsened last evening.  She developed fever to 100.9 with worsening hypoxia and was started on oxygen supplementation.  Fever resolved overnight.  Nursing reports that while trying to provide supplemental oxygen along with CPAP last night, the patient did not tolerate that combination and the patient herself requested stopping the supplemental oxygen, so CPAP was continued without oxygen overnight for this reason and nursing noted that her oxygen saturations were in the 80s without oxygen supplementation.  The patient does not normally use oxygen supplementation with CPAP at home.  Rate of atrial fibrillation was well controlled on diltiazem infusion last evening and diltiazem infusion was discontinued at about 11 PM last night.  Heart rate remained well controlled through the night until she awoke this morning.  After waking this morning and increasing her activity, her heart rate has increased and has consistently ranged 80 -120 at rest.  She does not have palpitations today and she says she normally notices palpitations when her atrial fibrillation is active.  At home she normally checks her blood pressure and heart rate every day and her heart rate is usually below 100.  When her heart rate gets above 100 from atrial  fibrillation, she typically will feel palpitations at home.  Today she denies any dizziness.  She remains short of breath but her shortness of breath is improved today.  Cough continues and feels looser today.  She has less tightness in her chest today.  Her left leg pain has improved today.  She does not recall any recent injury to her left leg.    Data reviewed today: I reviewed all medications, new labs and imaging results over the last 24 hours. I personally reviewed telemetry demonstrating atrial fibrillation with controlled ventricular response overnight but now increased ventricular rate today after waking.    Physical Exam   Vital Signs: Temp: 98.2  F (36.8  C) Temp src: Oral BP: 119/82 Pulse: 116   Resp: 20 SpO2: 94 % O2 Device: None (Room air) Oxygen Delivery: 3 LPM  Weight: 308 lbs 3.2 oz   Vitals:    07/31/21 1809 08/01/21 0341   Weight: 138.4 kg (305 lb 3.2 oz) 139.8 kg (308 lb 3.2 oz)     General Appearance: Appears comfortable sitting in a chair, able to speak full sentences, morbidly obese  Respiratory: Normal respiratory effort, mildly diminished breath sounds but improved from yesterday with improved air movement, scattered rhonchi without crackles, no wheezes except with coughing when high-pitched expiratory wheezes are evident  Cardiovascular: Irregularly irregular tachycardic rate and rhythm, good radial pulse, normal capillary refill, moderate peripheral edema in the lower legs  Skin: Left lower leg no longer appears erythematous and is not hot to touch although remains warm which is asymmetric compared with the right, left lower leg does appear purpuric and there is palpable hematoma in the lateral and posterior left lower leg today, plaque on the anterior right lower leg is less pink today and there is less erythema and discoloration of the patch on the back of the right lower leg today, no draining wounds are evident on the lower legs today  Other: Alert and maintains wakefulness and  attention, no confusion evident    Data   Recent Labs   Lab 08/01/21  0544 07/31/21  1300   WBC  --  9.3   HGB  --  12.3   MCV  --  97   PLT  --  187   INR 3.55* 2.93*   NA  --  139   POTASSIUM  --  4.0   CHLORIDE  --  106   CO2  --  27   BUN  --  18   CR  --  1.06*   ANIONGAP  --  6   MARY  --  8.8   GLC  --  112*   ALBUMIN  --  3.8   PROTTOTAL  --  7.1   BILITOTAL  --  0.8   ALKPHOS  --  109   ALT  --  26   AST  --  18   TROPONIN  --  <0.015     Recent Labs   Lab 07/31/21  1831 07/31/21  1301   PH 7.44  --    PCO2 42  --    PO2 71*  --    HCO3 28  --    O2PER 28 21     PaO2 to FiO2 ratio 254 on ABG yesterday    Lactic acid at 10 PM last night was 1.8, procalcitonin yesterday was negative and is pending today   today, increased from 7.5 yesterday  Blood culture is negative so far less than 24 hours    Recent Results (from the past 24 hour(s))   XR Chest Port 1 View    Narrative    CHEST PORTABLE ONE VIEW 7/31/2021 1:25 PM    HISTORY: Short of air.    COMPARISON: None.    FINDINGS: EKG wires overlie the chest. Accounting for portable  technique. The cardiomediastinal silhouette and pulmonary vasculature  are within normal limits. Aortic calcification. Lungs are clear  without effusion. No pneumothorax. Diffuse osteopenia. Surgical clips  overlie the left upper quadrant of the abdomen.      Impression    IMPRESSION: No acute pulmonary abnormality.    YASMIN DUNN MD         SYSTEM ID:  [id number]   US Lower Extremity Venous Duplex Bilateral    Narrative    US LOWER EXTREMITY VENOUS DUPLEX BILATERAL   7/31/2021 5:23 PM     HISTORY: Bilateral leg swelling, left greater than right.    COMPARISON: None.    TECHNIQUE: Spectral doppler and waveform analysis were performed on  the bilateral lower extremity veins. Exam was limited related to  patient body habitus and patient discomfort.    FINDINGS: The bilateral common femoral, femoral, and popliteal veins  are patent, compressible, and negative for deep venous  thrombosis.  Calf veins are segmentally seen but appear negative for DVT where  visualized.      Impression    IMPRESSION:  No evidence for deep venous thrombosis in the bilateral  lower extremity veins.    LISA RED MD         SYSTEM ID:  [id number]     Medications     dilTIAZem HCl-Sodium Chloride Stopped (07/31/21 2665)     Reason anticoagulant not prescribed for atrial fibrillation       Warfarin Therapy Reminder         aspirin  81 mg Oral Daily     atorvastatin  40 mg Oral Daily     ceFAZolin  1 g Intravenous Q8H     fluticasone-vilanterol  1 puff Inhalation Daily     ipratropium - albuterol 0.5 mg/2.5 mg/3 mL  3 mL Nebulization Q4H     lisinopril  40 mg Oral Daily     methylPREDNISolone  40 mg Intravenous Q8H    Followed by     [START ON 8/2/2021] predniSONE  40 mg Oral Daily with breakfast     metoprolol tartrate  50 mg Oral BID     pantoprazole  40 mg Oral Daily     rOPINIRole  3 mg Oral At Bedtime     sodium chloride (PF)  3 mL Intracatheter Q8H     warfarin-No DOSE today  1 each Does not apply no dose today (warfarin)

## 2021-08-01 NOTE — PLAN OF CARE
S-(situation): Patient changed to inpatient status    B-(background): Patient status change due to observation goals not being met.     A-(assessment): A&Ox4. VSS on room air. HR ranging from , tele reading A. Fib. LS clear. Sometime audible wheezing with activity. LLE red/brusied and 2+ edema. BS active, passing gas, appetite good. Voiding well. Ambulating with cane and standby assist.     R-(recommendations):  Will monitor patient per MD orders.       Inpatient nursing criteria listed below were met:    Adult Profile completedYes  Health care directives status obtained and documented: Yes  VTE prophylaxis orders: Coumadin  (FYI: Asprin is not an approved anticoagulation for DVT prophylaxis)  SCD's Documented:  N/A  Vaccine assessment done and vaccine ordered if needed. Not Applicable  My Chart patient sign up addressed and documented: No  Care Plan initiated: Yes  Discharge planning review completed (admission navigator) Yes

## 2021-08-01 NOTE — PLAN OF CARE
Cardizem drip was weaned off around 2315 as HR was consistently in the 60's-70's. Towards morning when pt was awake and more active, HR has increased into the 80's-90's and at times low 100's. She remains in a fib. Pt is very short of breath with activity and is wheezy at times. Left lower leg is red and warm. Pt reports increased pain when she stands. Norco given x2. Pt has been on room air most of the night with sats in the 87-95% range using her cpap.

## 2021-08-01 NOTE — PHARMACY-ANTICOAGULATION SERVICE
Clinical Pharmacy - Warfarin Dosing Consult     Pharmacy has been consulted to manage this patient s warfarin therapy.  Indication: Atrial Fibrillation  Therapy Goal: INR 2-3  Provider/Team: Yoshi FONG [facility]: [place]  Warfarin Prior to Admission: Yes  Warfarin PTA Regimen: 5 mg Monday, Wednesday, Friday; 2.5 mg All other days of the week  Recent documented change in oral intake/nutrition: Unknown    INR   Date Value Ref Range Status   08/01/2021 3.55 (H) 0.85 - 1.15 Final     Comment:     Effective 7/11/2021, the reference range for this assay has changed.   07/31/2021 2.93 (H) 0.85 - 1.15 Final     Comment:     Effective 7/11/2021, the reference range for this assay has changed.     INR bumped to 3.55 today, suspect due to acute illness.  Will hold warfarin dose today.    Recommend warfarin ZERO mg today.  Pharmacy will monitor Nanette Edwards daily and order warfarin doses to achieve specified goal.      Please contact pharmacy as soon as possible if the warfarin needs to be held for a procedure or if the warfarin goals change.      Casey Baum [facility],PharmD.........August 1, 2021 7:26 AM

## 2021-08-01 NOTE — PROGRESS NOTES
S-(situation): Patient registered to Observation. Patient arrived to room 218 via cart from ED    B-(background): Rapid a fib, asthma exacerbation    A-(assessment): Pt is alert and oriented. Reports pain when standing in left lower leg. Area is red, warm and swollen. Pt was concerned about blood clot which is why she stopped at the hospital on her way home from vacation. HR currently in the 130's. Pt is short of breath with activity. Pt does not typically use oxygen but is requiring at this time. Cardizem started at 10mg/hr.     R-(recommendations): Orders and observation goals reviewed with patient and spouse    Nursing Observation criteria listed below was met:    Skin issues/needs documented:Yes  Isolation needs addressed and Signage up: NA  Fall Prevention: Education given and documented: Yes  Education Assessment documented:Yes  Admission Education Documented: Yes  New medication patient education completed and documented (Possible Side Effects of Common Medications handout): Yes  OBS video/handout Reviewed & Documented: Yes  Allergies Reviewed: Yes  Medication Reconciliation Complete: Yes  Home medications if not able to send immediately home with family stored here: NA  Reminder note placed in discharge instructions of home meds: NA  Patient has discharge needs (If yes, please explain): No  Patient discharge preferences addressed and charted on white board:  Yes

## 2021-08-01 NOTE — PROGRESS NOTES
"S- Transfer to 272 from ICU, 218.    B- acute asthma exacerbation, AFIB    A- Brief systems assessment: alert and oriented.  LS CTA.  On room air.  Receiving solumedrol and duonebs. 's.  Receiving po cardizem.       R- Transfer to 272 per physician orders. Continue to monitor pt and update physician as needed.      Code status: Full Code  Skin: bruised, blanchable redness legs  Fall Risk: Yes- Department fall risk interventions implemented.  Isolation and Signage: None  Medication drips upon transfer: No  Blue Bin checked and medications transfer out with patientYesABLE\"}    "

## 2021-08-02 ENCOUNTER — APPOINTMENT (OUTPATIENT)
Dept: CARDIOLOGY | Facility: CLINIC | Age: 76
DRG: 308 | End: 2021-08-02
Attending: PEDIATRICS
Payer: COMMERCIAL

## 2021-08-02 PROBLEM — R60.0 PERIPHERAL EDEMA: Status: ACTIVE | Noted: 2021-08-01

## 2021-08-02 PROBLEM — J81.0 ACUTE PULMONARY EDEMA (H): Status: ACTIVE | Noted: 2021-07-31

## 2021-08-02 LAB
ANION GAP SERPL CALCULATED.3IONS-SCNC: 6 MMOL/L (ref 3–14)
BUN SERPL-MCNC: 31 MG/DL (ref 7–30)
CALCIUM SERPL-MCNC: 8.7 MG/DL (ref 8.5–10.1)
CHLORIDE BLD-SCNC: 102 MMOL/L (ref 94–109)
CO2 SERPL-SCNC: 27 MMOL/L (ref 20–32)
CREAT SERPL-MCNC: 1.17 MG/DL (ref 0.52–1.04)
CRP SERPL-MCNC: 93.1 MG/L (ref 0–8)
GFR SERPL CREATININE-BSD FRML MDRD: 46 ML/MIN/1.73M2
GLUCOSE BLD-MCNC: 142 MG/DL (ref 70–99)
HBA1C MFR BLD: 5.9 % (ref 0–5.6)
INR PPP: 4.28 (ref 0.85–1.15)
LACTATE SERPL-SCNC: 1.6 MMOL/L (ref 0.7–2)
LVEF ECHO: NORMAL
POTASSIUM BLD-SCNC: 4.1 MMOL/L (ref 3.4–5.3)
SODIUM SERPL-SCNC: 135 MMOL/L (ref 133–144)

## 2021-08-02 PROCEDURE — 250N000011 HC RX IP 250 OP 636: Performed by: INTERNAL MEDICINE

## 2021-08-02 PROCEDURE — 250N000013 HC RX MED GY IP 250 OP 250 PS 637: Performed by: PEDIATRICS

## 2021-08-02 PROCEDURE — 250N000013 HC RX MED GY IP 250 OP 250 PS 637: Performed by: INTERNAL MEDICINE

## 2021-08-02 PROCEDURE — 85610 PROTHROMBIN TIME: CPT | Performed by: PEDIATRICS

## 2021-08-02 PROCEDURE — 999N000208 ECHOCARDIOGRAM COMPLETE

## 2021-08-02 PROCEDURE — 83605 ASSAY OF LACTIC ACID: CPT | Performed by: PEDIATRICS

## 2021-08-02 PROCEDURE — 80048 BASIC METABOLIC PNL TOTAL CA: CPT | Performed by: PEDIATRICS

## 2021-08-02 PROCEDURE — 255N000002 HC RX 255 OP 636: Performed by: INTERNAL MEDICINE

## 2021-08-02 PROCEDURE — 83036 HEMOGLOBIN GLYCOSYLATED A1C: CPT | Performed by: PEDIATRICS

## 2021-08-02 PROCEDURE — 120N000001 HC R&B MED SURG/OB

## 2021-08-02 PROCEDURE — 99232 SBSQ HOSP IP/OBS MODERATE 35: CPT | Performed by: PEDIATRICS

## 2021-08-02 PROCEDURE — 93306 TTE W/DOPPLER COMPLETE: CPT | Mod: 26 | Performed by: INTERNAL MEDICINE

## 2021-08-02 PROCEDURE — 36415 COLL VENOUS BLD VENIPUNCTURE: CPT | Performed by: PEDIATRICS

## 2021-08-02 PROCEDURE — 86140 C-REACTIVE PROTEIN: CPT | Performed by: PEDIATRICS

## 2021-08-02 PROCEDURE — 250N000012 HC RX MED GY IP 250 OP 636 PS 637: Performed by: PEDIATRICS

## 2021-08-02 RX ORDER — ALBUTEROL SULFATE 90 UG/1
2 AEROSOL, METERED RESPIRATORY (INHALATION) EVERY 6 HOURS PRN
Status: DISCONTINUED | OUTPATIENT
Start: 2021-08-02 | End: 2021-08-03 | Stop reason: HOSPADM

## 2021-08-02 RX ORDER — ALBUTEROL SULFATE 90 UG/1
2 AEROSOL, METERED RESPIRATORY (INHALATION)
Status: DISCONTINUED | OUTPATIENT
Start: 2021-08-02 | End: 2021-08-03 | Stop reason: HOSPADM

## 2021-08-02 RX ORDER — DILTIAZEM HYDROCHLORIDE 30 MG/1
90 TABLET, FILM COATED ORAL 4 TIMES DAILY
Status: DISCONTINUED | OUTPATIENT
Start: 2021-08-02 | End: 2021-08-03 | Stop reason: HOSPADM

## 2021-08-02 RX ORDER — FUROSEMIDE 40 MG
40 TABLET ORAL
Status: DISCONTINUED | OUTPATIENT
Start: 2021-08-02 | End: 2021-08-03 | Stop reason: HOSPADM

## 2021-08-02 RX ADMIN — DILTIAZEM HYDROCHLORIDE 90 MG: 30 TABLET, FILM COATED ORAL at 21:06

## 2021-08-02 RX ADMIN — FUROSEMIDE 40 MG: 40 TABLET ORAL at 16:36

## 2021-08-02 RX ADMIN — CEPHALEXIN 500 MG: 250 CAPSULE ORAL at 16:37

## 2021-08-02 RX ADMIN — HYDROCODONE BITARTRATE AND ACETAMINOPHEN 1 TABLET: 5; 325 TABLET ORAL at 21:51

## 2021-08-02 RX ADMIN — ATORVASTATIN CALCIUM 40 MG: 40 TABLET, FILM COATED ORAL at 08:12

## 2021-08-02 RX ADMIN — ROPINIROLE HYDROCHLORIDE 3 MG: 0.25 TABLET, FILM COATED ORAL at 21:06

## 2021-08-02 RX ADMIN — DILTIAZEM HYDROCHLORIDE 90 MG: 30 TABLET, FILM COATED ORAL at 08:03

## 2021-08-02 RX ADMIN — FUROSEMIDE 40 MG: 40 TABLET ORAL at 08:12

## 2021-08-02 RX ADMIN — Medication 400 MG: at 21:06

## 2021-08-02 RX ADMIN — HUMAN ALBUMIN MICROSPHERES AND PERFLUTREN 4 ML: 10; .22 INJECTION, SOLUTION INTRAVENOUS at 11:46

## 2021-08-02 RX ADMIN — CEFAZOLIN 1 G: 1 INJECTION, POWDER, FOR SOLUTION INTRAMUSCULAR; INTRAVENOUS at 05:47

## 2021-08-02 RX ADMIN — DILTIAZEM HYDROCHLORIDE 90 MG: 30 TABLET, FILM COATED ORAL at 12:20

## 2021-08-02 RX ADMIN — PANTOPRAZOLE SODIUM 40 MG: 40 TABLET, DELAYED RELEASE ORAL at 08:12

## 2021-08-02 RX ADMIN — FLUTICASONE FUROATE AND VILANTEROL TRIFENATATE 1 PUFF: 100; 25 POWDER RESPIRATORY (INHALATION) at 10:34

## 2021-08-02 RX ADMIN — DILTIAZEM HYDROCHLORIDE 90 MG: 30 TABLET, FILM COATED ORAL at 16:36

## 2021-08-02 RX ADMIN — ASPIRIN 81 MG: 81 TABLET ORAL at 08:12

## 2021-08-02 RX ADMIN — PREDNISONE 40 MG: 20 TABLET ORAL at 08:04

## 2021-08-02 RX ADMIN — METOPROLOL TARTRATE 50 MG: 50 TABLET, FILM COATED ORAL at 21:06

## 2021-08-02 RX ADMIN — METOPROLOL TARTRATE 50 MG: 50 TABLET, FILM COATED ORAL at 08:12

## 2021-08-02 RX ADMIN — CEFAZOLIN 1 G: 1 INJECTION, POWDER, FOR SOLUTION INTRAMUSCULAR; INTRAVENOUS at 13:33

## 2021-08-02 RX ADMIN — Medication 400 MG: at 08:12

## 2021-08-02 RX ADMIN — CEPHALEXIN 500 MG: 250 CAPSULE ORAL at 21:06

## 2021-08-02 RX ADMIN — HYDROCODONE BITARTRATE AND ACETAMINOPHEN 1 TABLET: 5; 325 TABLET ORAL at 03:48

## 2021-08-02 ASSESSMENT — ACTIVITIES OF DAILY LIVING (ADL)
ADLS_ACUITY_SCORE: 15

## 2021-08-02 ASSESSMENT — MIFFLIN-ST. JEOR: SCORE: 1824.01

## 2021-08-02 NOTE — PHARMACY-ANTICOAGULATION SERVICE
Clinical Pharmacy - Warfarin Dosing Consult     Pharmacy has been consulted to manage this patient s warfarin therapy.  Indication: Atrial Fibrillation  Therapy Goal: INR 2-3  Provider/Team: Yoshi FONG [facility]: [place]  Warfarin Prior to Admission: Yes  Warfarin PTA Regimen: 5 mg Monday, Wednesday, Friday; 2.5 mg All other days of the week  Recent documented change in oral intake/nutrition: Unknown    INR   Date Value Ref Range Status   08/02/2021 4.28 (H) 0.85 - 1.15 Final     Comment:     Effective 7/11/2021, the reference range for this assay has changed.   08/01/2021 3.55 (H) 0.85 - 1.15 Final     Comment:     Effective 7/11/2021, the reference range for this assay has changed.       Recommend warfarin NO DOSE today.  Pharmacy will monitor Nanette Edwards daily and order warfarin doses to achieve specified goal.      Please contact pharmacy as soon as possible if the warfarin needs to be held for a procedure or if the warfarin goals change.

## 2021-08-02 NOTE — PLAN OF CARE
Neuro: A/Ox4  CMS: Intact  Pulmonary: Audible wheeze otherwise LS CTA  CV: Tele  RVR A-Fib,   GI: Bowel sounds active x4  : Voiding adequately  Skin: Left lower led rajesh in color, Blister to shin intact. No weaping at this time, scab to Right lateral shin, Left leg scab to calf. Pt reports pain to lower legs. Legs elevated, 2/1+ edema to BLE.  Lines/Tubes/Drains: PIV  Drips: Saline locked.     Plan: Will continue to monitor POC.

## 2021-08-02 NOTE — PROGRESS NOTES
[facility]    Medicine Progress Note - Hospitalist Service       Date of Admission:  7/31/2021    Assessment & Plan            Nanette Edwards is a 76 year old female admitted due to concerns for acute respiratory failure, asthma exacerbation, rapid atrial fibrillation, venous stasis with skin ulcerations and cellulitis in the left lower leg and fever concerning for systemic inflammatory response syndrome.  It remains unclear whether asthma exacerbation precipitated rapid atrial fibrillation or whether rapid atrial fibrillation precipitated mild acute pulmonary edema and asthma exacerbation.  Although radiographic findings were not suspicious for pulmonary edema, BNP was elevated and she has signs of volume overload on exam.  Additionally, despite improvement in asthma exacerbation and respiratory failure, rapid atrial fibrillation persists.   This increases suspicion had a rapid atrial fibrillation with mild pulmonary edema precipitated asthma exacerbation along with worsening air quality locally in the last week.      Clinical appearance of left lower leg is more suspicious today for hematoma than infection.  She takes warfarin chronically which causes coagulation defect and aspirin chronically which causes platelet function defect both of which can be associated with bleeding and likely contribute to risk for this hematoma.  Tachycardia and tachypnea concerning for systemic inflammatory response syndrome may have been due to asthma exacerbation although asthma exacerbation would not explain fever.  Fever could also have contributed to tachycardia and tachypnea.  She is at high risk for an adverse outcome including worsening respiratory failure and systemic inflammatory response syndrome and associated consequences, so ongoing hospitalization is considered medically necessary.  Based on the presently available information, hospitalization for at least 2 midnights is  anticipated.    Principal Problem:    Severe persistent asthma with (acute) exacerbation  Assessment: Typical clinical presentation, improving with steroids and bronchodilators, normally uses Advair for maintenance therapy with rescue inhaler as needed  Plan: Switch from nebulizer to inhaler therapy 4 times daily today and from IV to oral corticosteroids, anticipate discharge with tapering course of steroids, resumption of maintenance inhaler, and use of rescue inhaler regularly as well as prn for the next week    Active Problems:    Acute respiratory failure with hypoxia and hypercapnia (H)  Assessment: Presented with respiratory distress and mild hypercapnia and developed severe hypoxia concerning for acute respiratory failure with hypoxia and hypercapnia, respiratory failure has resolved with treatment of asthma exacerbation but also IV Lasix and slowing of rapid heart rate from atrial fibrillation, has not required oxygen supplementation now for about 24 hours  Plan: Follow oxygenation while advancing activity, use home CPAP during sleep      Atrial fibrillation with RVR (H)  Assessment: Heart rates initially in the 150s, now improved to 110-130 at rest, not overtly symptomatic currently, did have elevated BNP concerning for acute pulmonary edema from rapid atrial fibrillation, has not had recent echocardiogram but is not known to have underlying valvular disease, improving on higher dose diltiazem which she had not previously been taking on a regular basis but now having lower blood pressure readings on the regular higher dose of diltiazem  Plan: Echocardiogram today, increase diltiazem to 90 mg 4 times daily if tolerated by blood pressure, continue telemetry, discontinuing chronic amlodipine while starting scheduled diltiazem and for now holding chronic lisinopril      Acute pulmonary edema (H)-possible  Assessment: Elevated BNP with severe hypoxia associated with rapid atrial fibrillation is concerning for  acute pulmonary edema from rapid atrial fibrillation although initial radiographic findings were not suspicious for pulmonary edema, does have peripheral edema also consistent with volume overload  Plan: Echocardiogram today, increase chronic Lasix dose from 40 mg once daily to 40 mg twice daily      Cellulitis of left lower leg  Assessment: Admitted with typical clinical findings for cellulitis of the left lower leg likely due to venous stasis ulcers and had fever with significant increase in CRP and procalcitonin both of which are concerning for bacterial infection, much improved cellulitis after starting antibiotics, appears to have nonpurulent cellulitis and cultures are negative so far  Plan: Switch IV Ancef to oral cephalexin to complete treatment course for cellulitis      Stage 3a chronic kidney disease  Assessment: Stable renal function compared with her baseline but chronic kidney disease increases her risk for volume overload  Plan: Increase Lasix from once to twice daily today      SIRS (systemic inflammatory response syndrome) (H)-fever, tachycardia, tachypnea  Assessment: Initial tachycardia and tachypnea were associated with rapid atrial fibrillation and asthma exacerbation but she subsequently developed fever more likely due to infection and cellulitis in the leg, sepsis is possible, fever subsided quickly with initiation of antibiotic therapy  Plan: Complete course of antibiotics      Hematoma of left lower leg  Assessment: As signs of cellulitis resolved she subsequently developed increasing ecchymosis with probable hematoma in the left lower leg likely due to the combination of acute inflammatory process and excessive anticoagulation with supratherapeutic INR from chronic warfarin therapy, ecchymosis and hematoma already appears to be improving  Plan: No acute intervention      Hypomagnesemia  Assessment: Mild but admitted with asthma exacerbation and rapid atrial fibrillation  Plan: Replacing  magnesium according to protocol      Coagulation defect (H)-chronic warfarin  Assessment: Chronic coagulation defect associated with warfarin, INR higher than the therapeutic range during hospitalization with hematoma in the left lower leg probably due at least partly to coagulation defect from warfarin  Plan: Pharmacy assisting with warfarin dosing during hospitalization      Platelet function defect (H)-chronic ASA  Assessment: Chronic aspirin also can cause platelet function defect which may also have contributed to onset of hematoma in the left lower leg  Plan: Continuing aspirin for now      Peripheral edema  Assessment: Ultrasound both lower extremities negative for DVT, patient reported worsening peripheral edema recently, could be due to heart failure  Plan: Echo today, start Ace wraps, suggested patient contact her PCP at home in [place] regarding her request for custom made compression stockings      Steroid-induced hyperglycemia  Assessment: Moderately elevated blood sugars after starting corticosteroid treatment of asthma exacerbation, no history of diabetes, hemoglobin A1c 5.9  Plan: No acute intervention      Morbid obesity (H)  Assessment: Chronic  Plan: No acute intervention      Lymphedema  Assessment: Lower extremities  Plan: No acute intervention      Anticoagulated on Coumadin  Assessment: As above  Plan: As above      VIKTOR on CPAP  Assessment: Chronic  Plan: Using home CPAP machine during hospitalization when sleeping      Hypercapnia with mixed acid-base disorder  Assessment: Hypercapnic at admission with compensatory metabolic alkalosis  Plan: As above      Venous ulcer of left leg (H)  Assessment: Superficial venous stasis ulcerations of the left leg noted at admission and appear to be resolving  Plan: Treat leg edema with increasing diuretic therapy and compression         Diet: 2 Gram Sodium Diet    DVT Prophylaxis: Warfarin  Lyle Catheter: Not present  Central Lines: None  Code Status: Full  Code      Disposition Plan   Expected discharge: Tomorrow recommended to prior living arrangement once Heart rate adequately controlled.     The patient's care was discussed with the Bedside Nurse, Care Coordinator/, Patient and Patient's Family.    Carlos Enrique Ramos MD  Hospitalist Service  [facility]  Securely message with the Vocera Web Console (learn more here)  Text page via [employer] Paging/Directory      Clinically Significant Risk Factors Present on Admission               ______________________________________________________________________    Interval History   She is generally feeling better today, less short of breath.  Her cough continues to loosen and she is now coughing up clear phlegm.  She can take deeper breaths with less of a sense of tightness in her chest.  She still is not having any palpitations which she finds surprising because she normally feels palpitations from atrial fibrillation when her heart rate was over 100 at home.  She denies any dizziness.  She remains afebrile.  Blood pressure has been stable but heart rate has been persistently tachycardic.  Oxygenation has been stable without need for oxygen supplementation now for about 24 hours.  Urine output has been adequate.  She did use CPAP during sleep last night without difficulty.  Her left leg pain is improving and the bruising also appears better today.    Data reviewed today: I reviewed all medications, new labs and imaging results over the last 24 hours. I personally reviewed telemetry strips demonstrating persistent atrial fibrillation with rapid ventricular response at rest overnight and earlier this morning.    Physical Exam   Vital Signs: Temp: 97  F (36.1  C) Temp src: Oral BP: 104/59 Pulse: 76   Resp: 20 SpO2: 93 % O2 Device: None (Room air)    Weight: 303 lbs 4.8 oz   Vitals:    07/31/21 1809 08/01/21 0341 08/02/21 0352   Weight: 138.4 kg (305 lb 3.2 oz) 139.8 kg (308 lb 3.2 oz)  137.6 kg (303 lb 4.8 oz)     General Appearance: Presently appears comfortable sitting up in bed, able to speak full sentences  Respiratory: Normal respiratory effort, diminished breath sounds but improved air movement compared with yesterday, no crackles or wheezing  Cardiovascular: Irregularly irregular heart rate and rhythm but not tachycardic, good radial pulse, brisk capillary refill, peripheral edema present in the lower legs left greater than right  GI: Morbidly obese abdomen, soft  Skin: No erythema of the left lower leg, purpuric area on the lateral and posterior left lower leg is fading     Data   Recent Labs   Lab 08/02/21  0554 08/01/21  1013 08/01/21  0544 07/31/21  1300   WBC  --   --   --  9.3   HGB  --   --   --  12.3   MCV  --   --   --  97   PLT  --   --   --  187   INR 4.28*  --  3.55* 2.93*    134  --  139   POTASSIUM 4.1 3.7  --  4.0   CHLORIDE 102 101  --  106   CO2 27 25  --  27   BUN 31* 26  --  18   CR 1.17* 1.20*  --  1.06*   ANIONGAP 6 8  --  6   MARY 8.7 8.8  --  8.8   * 242*  --  112*   ALBUMIN  --   --   --  3.8   PROTTOTAL  --   --   --  7.1   BILITOTAL  --   --   --  0.8   ALKPHOS  --   --   --  109   ALT  --   --   --  26   AST  --   --   --  18   TROPONIN  --   --   --  <0.015     No results found for this or any previous visit (from the past 24 hour(s)).  Medications     Reason anticoagulant not prescribed for atrial fibrillation       - MEDICATION INSTRUCTIONS -       Warfarin Therapy Reminder         aspirin  81 mg Oral Daily     atorvastatin  40 mg Oral Daily     ceFAZolin  1 g Intravenous Q8H     diltiazem  90 mg Oral 4x Daily     fluticasone-vilanterol  1 puff Inhalation Daily     furosemide  40 mg Oral Daily     ipratropium - albuterol 0.5 mg/2.5 mg/3 mL  3 mL Nebulization 4x daily     magnesium oxide  400 mg Oral BID     metoprolol tartrate  50 mg Oral BID     pantoprazole  40 mg Oral Daily     predniSONE  40 mg Oral Daily with breakfast     rOPINIRole  3 mg Oral  At Bedtime     sodium chloride (PF)  3 mL Intracatheter Q8H     warfarin-No DOSE today  1 each Does not apply no dose today (warfarin)

## 2021-08-03 VITALS
HEART RATE: 68 BPM | RESPIRATION RATE: 20 BRPM | TEMPERATURE: 96.2 F | DIASTOLIC BLOOD PRESSURE: 57 MMHG | WEIGHT: 293 LBS | OXYGEN SATURATION: 93 % | BODY MASS INDEX: 53.92 KG/M2 | HEIGHT: 62 IN | SYSTOLIC BLOOD PRESSURE: 120 MMHG

## 2021-08-03 LAB
INR PPP: 3.34 (ref 0.85–1.15)
MAGNESIUM SERPL-MCNC: 2.1 MG/DL (ref 1.6–2.3)

## 2021-08-03 PROCEDURE — 36415 COLL VENOUS BLD VENIPUNCTURE: CPT | Performed by: PEDIATRICS

## 2021-08-03 PROCEDURE — 83735 ASSAY OF MAGNESIUM: CPT | Performed by: PEDIATRICS

## 2021-08-03 PROCEDURE — 250N000013 HC RX MED GY IP 250 OP 250 PS 637: Performed by: PEDIATRICS

## 2021-08-03 PROCEDURE — 250N000012 HC RX MED GY IP 250 OP 636 PS 637: Performed by: PEDIATRICS

## 2021-08-03 PROCEDURE — 85610 PROTHROMBIN TIME: CPT | Performed by: PEDIATRICS

## 2021-08-03 PROCEDURE — 250N000013 HC RX MED GY IP 250 OP 250 PS 637: Performed by: INTERNAL MEDICINE

## 2021-08-03 PROCEDURE — 99239 HOSP IP/OBS DSCHRG MGMT >30: CPT | Performed by: INTERNAL MEDICINE

## 2021-08-03 RX ORDER — CEPHALEXIN 500 MG/1
500 CAPSULE ORAL 3 TIMES DAILY
Qty: 18 CAPSULE | Refills: 0 | Status: SHIPPED | OUTPATIENT
Start: 2021-08-03 | End: 2021-08-09

## 2021-08-03 RX ORDER — ACETAMINOPHEN 325 MG/1
325 TABLET ORAL EVERY 4 HOURS PRN
Qty: 30 TABLET | Refills: 0 | Status: SHIPPED | OUTPATIENT
Start: 2021-08-03

## 2021-08-03 RX ORDER — AZITHROMYCIN 250 MG/1
TABLET, FILM COATED ORAL
Qty: 6 TABLET | Refills: 0 | Status: SHIPPED | OUTPATIENT
Start: 2021-08-03 | End: 2021-08-08

## 2021-08-03 RX ORDER — WARFARIN SODIUM 2.5 MG/1
2.5 TABLET ORAL
Status: DISCONTINUED | OUTPATIENT
Start: 2021-08-03 | End: 2021-08-03 | Stop reason: HOSPADM

## 2021-08-03 RX ORDER — PREDNISONE 10 MG/1
TABLET ORAL
Qty: 30 TABLET | Refills: 0 | Status: SHIPPED | OUTPATIENT
Start: 2021-08-03

## 2021-08-03 RX ORDER — FUROSEMIDE 40 MG
40 TABLET ORAL
Qty: 60 TABLET | Refills: 1 | Status: SHIPPED | OUTPATIENT
Start: 2021-08-03

## 2021-08-03 RX ORDER — WARFARIN SODIUM 2.5 MG/1
2.5 TABLET ORAL DAILY
Qty: 15 TABLET | Refills: 0 | Status: SHIPPED | OUTPATIENT
Start: 2021-08-04

## 2021-08-03 RX ADMIN — ASPIRIN 81 MG: 81 TABLET ORAL at 08:29

## 2021-08-03 RX ADMIN — FLUTICASONE FUROATE AND VILANTEROL TRIFENATATE 1 PUFF: 100; 25 POWDER RESPIRATORY (INHALATION) at 08:25

## 2021-08-03 RX ADMIN — Medication 400 MG: at 08:25

## 2021-08-03 RX ADMIN — DILTIAZEM HYDROCHLORIDE 90 MG: 30 TABLET, FILM COATED ORAL at 12:15

## 2021-08-03 RX ADMIN — HYDROCODONE BITARTRATE AND ACETAMINOPHEN 1 TABLET: 5; 325 TABLET ORAL at 13:43

## 2021-08-03 RX ADMIN — FUROSEMIDE 40 MG: 40 TABLET ORAL at 08:29

## 2021-08-03 RX ADMIN — ATORVASTATIN CALCIUM 40 MG: 40 TABLET, FILM COATED ORAL at 08:25

## 2021-08-03 RX ADMIN — CEPHALEXIN 500 MG: 250 CAPSULE ORAL at 13:43

## 2021-08-03 RX ADMIN — DILTIAZEM HYDROCHLORIDE 90 MG: 30 TABLET, FILM COATED ORAL at 08:25

## 2021-08-03 RX ADMIN — METOPROLOL TARTRATE 50 MG: 50 TABLET, FILM COATED ORAL at 08:29

## 2021-08-03 RX ADMIN — PANTOPRAZOLE SODIUM 40 MG: 40 TABLET, DELAYED RELEASE ORAL at 08:29

## 2021-08-03 RX ADMIN — PREDNISONE 40 MG: 20 TABLET ORAL at 08:29

## 2021-08-03 RX ADMIN — CEPHALEXIN 500 MG: 250 CAPSULE ORAL at 08:29

## 2021-08-03 ASSESSMENT — ACTIVITIES OF DAILY LIVING (ADL)
ADLS_ACUITY_SCORE: 15

## 2021-08-03 ASSESSMENT — MIFFLIN-ST. JEOR: SCORE: 1824.92

## 2021-08-03 NOTE — PLAN OF CARE
"Heart rate in 70's, see f/s. BLE edema, ace wrapped. Lungs dim, exp wheezes with exertion./45   Pulse 74   Temp 97  F (36.1  C) (Oral)   Resp 20   Ht 1.575 m (5' 2\")   Wt 137.6 kg (303 lb 4.8 oz)   SpO2 93%   BMI 55.47 kg/m   will continue with POC.  "

## 2021-08-03 NOTE — PHARMACY-ANTICOAGULATION SERVICE
Clinical Pharmacy - Warfarin Dosing Consult     Pharmacy has been consulted to manage this patient s warfarin therapy.  Indication: Atrial Fibrillation  Therapy Goal: INR 2-3  Provider/Team: Yoshi FONG [facility]: [place]  Warfarin Prior to Admission: Yes  Warfarin PTA Regimen: 5 mg Monday, Wednesday, Friday; 2.5 mg All other days of the week  Recent documented change in oral intake/nutrition: Unknown    INR   Date Value Ref Range Status   08/03/2021 3.34 (H) 0.85 - 1.15 Final     Comment:     Effective 7/11/2021, the reference range for this assay has changed.   08/02/2021 4.28 (H) 0.85 - 1.15 Final     Comment:     Effective 7/11/2021, the reference range for this assay has changed.       Recommend warfarin 2.5 mg today.  Pharmacy will monitor Nanette Edwards daily and order warfarin doses to achieve specified goal.      Please contact pharmacy as soon as possible if the warfarin needs to be held for a procedure or if the warfarin goals change.

## 2021-08-03 NOTE — PLAN OF CARE
VSS. HR does dip into the 40's while pt is sleeping. Pt asymptomatic. Pt is still in a fib on the monitor. Has good urine output. LS diminished. Pt still has a productive cough. Ace wraps removed for bedtime. Pt requesting that her  be here when they are reapplied for the day so he knows how to put them on. Will continue to monitor per plan of care. Report will be given to the oncoming RN.

## 2021-08-03 NOTE — PLAN OF CARE
"S-(situation): Patient discharged to home via w/c with .    B-(background): acute respiratory failure    A-(assessment): short of breath with activity, recovers with rest. BLE edema, left greater than right, acewrapped. Transfers with min SBA. Received [place] prior to discharge for left lower leg pain. /57 (BP Location: Left arm)   Pulse 68   Temp (!) 96.2  F (35.7  C) (Oral)   Resp 20   Ht 1.575 m (5' 2\")   Wt 137.7 kg (303 lb 8 oz)   SpO2 93%   BMI 55.51 kg/m      R-(recommendations): Discharge instructions reviewed with pt and spouse. Listed belongings gathered and returned to patient.          Discharge Nursing Criteria:     Care Plan and Patient education resolved: Yes    New Medications- pt has been educated about purpose and side effects: Yes    Vaccines  Influenza status verified at discharge:  Yes    MISC  Prescriptions if needed, hard copies sent with patient  Yes  Home medications returned to patient: NA  Medication Bin checked and emptied on discharge Yes  Patient reports post-discharge pain management plan is effective: Yes    "

## 2021-08-04 ENCOUNTER — PATIENT OUTREACH (OUTPATIENT)
Dept: CARE COORDINATION | Facility: CLINIC | Age: 76
End: 2021-08-04

## 2021-08-04 DIAGNOSIS — Z71.89 OTHER SPECIFIED COUNSELING: ICD-10-CM

## 2021-08-04 NOTE — PROGRESS NOTES
Clinic Care Coordination Contact  [facility]: Post-Discharge Note  SITUATION                                                      Admission:    Admission Date: 07/31/21   Reason for Admission: Severe persistent asthma with (acute) exacerbation  Discharge:   Discharge Date: 08/03/21  Discharge Diagnosis: Severe persistent asthma with (acute) exacerbation    BACKGROUND                                                      Nanette Edwards is a 76 year old female hospitalized on 7/31/2021. She presents with symptoms, signs, and test results concerning for acute respiratory distress, asthma exacerbation, rapid atrial fibrillation, and venous stasis with skin ulcerations and and acute inflammatory process in the left lower leg.  She has not improved adequately for home discharge despite initial treatment in the emergency room, so ongoing hospitalization for observation is considered medically necessary.  Based on the presently available information, hospitalization for about 24 hours is anticipated.       ASSESSMENT      Discharge Assessment  How are your symptoms? (Red Flag symptoms escalate to triage hotline per guidelines): Improved  Do you feel your condition is stable enough to be safe at home until your provider visit?: Yes  Does the patient have their discharge instructions? : Yes  Does the patient have questions regarding their discharge instructions? : No  Were you started on any new medications or were there changes to any of your previous medications? : Yes - Schedule RN[facility] appt within 48 business hours  Does the patient have all of their medications?: Yes  Do you have questions regarding any of your medications? : No  Do you have all of your needed medical supplies or equipment (DME)?  (i.e. oxygen tank, CPAP, cane, etc.): Yes  Discharge follow-up appointment scheduled within 14 calendar days? : Yes  Discharge Follow Up Appointment Date: 08/04/21  Discharge Follow Up Appointment Scheduled with?:  Specialty Care Provider    Post-op  Did the patient have surgery or a procedure: No  Fever: No  Chills: No  Eating & Drinking: eating and drinking without complaints/concerns  PO Intake: regular diet  Urinary Status: voiding without complaint/concerns        PLAN                                                      Outpatient Plan:  Tomorrow recommended to prior living arrangement once Breathing has improved with resolved respiratory distress, rate of atrial fibrillation is adequately controlled, and symptoms and signs of acute inflammatory process in the left lower leg are stable or improved.       No future appointments.      For any urgent concerns, please contact our 24 hour nurse triage line: [phone] ([phone])       Mari Borrero   Community Health Worker   Connected Care Resource [facility]  Ph: [phone]  Fx: [phone]

## 2021-08-05 LAB — BACTERIA BLD CULT: NO GROWTH

## 2021-08-16 NOTE — DISCHARGE SUMMARY
Former[facility]  Hospitalist Discharge Summary      Date of Admission:  7/31/2021  Date of Discharge:  8/3/2021  2:15 PM  Discharging Provider: Ashvin Chua MD      Discharge Diagnoses                Acute hypoxic hypercapnic  respiratory failure      Asthma exacerbation : managed with nebs, steroids.      A fib rvr, chronic :  cardizem was started and metoprolol continued at discharge for rvr while lisinopril and norvasc were discontinued, on coumadin.      Acute diastolic CHF : managed with iv lasix and switched to oral lasix      B/l lymphedema : managed with iv lasix and switched to oral lasix.      Cellulitis left leg : managed with iv ancef following which she showed clinical improvement and switched to oral keflex at discharge.      Left lower leg hematoma 2/2 to [place] : hb stable, outpatient pcp f/u.      CKD stage 3a : stable.      Morbid obesity : outpatient f/u      Sleep apnea : on c pap at night                                        Follow-ups Needed After Discharge   Follow-up Appointments     Follow-up and recommended labs and tests       Follow up with primary care provider, Physician No Ref-Primary, within 7   days for hospital follow- up.  The following labs/tests are recommended:   INR, BMP.    Follow up with   Cardiology     As advised in  1-2 weeks         {Additional follow-up instructions/to-do's for PCP     Unresulted Labs Ordered in the Past 30 Days of this Admission     No orders found from 7/1/2021 to 8/1/2021.      These results will be followed up by pcp    Discharge Disposition   Discharged to home  Condition at discharge: Stable        Hospital Course          Nanette Edwards is a 76 year old female admitted due to concerns for acute respiratory failure, asthma exacerbation, rapid atrial fibrillation, venous stasis with skin ulcerations and cellulitis in the left lower leg and fever concerning for systemic inflammatory response syndrome.  It remained  unclear whether asthma exacerbation precipitated rapid atrial fibrillation or whether rapid atrial fibrillation precipitated mild acute pulmonary edema and asthma exacerbation.  Although radiographic findings were not suspicious for pulmonary edema, BNP was elevated and she had signs of volume overload on exam.  Additionally, despite improvement in asthma exacerbation and respiratory failure, rapid atrial fibrillation persisted.       Patient was managed with nebs and steroids for asthma flare up and iv diuresis for acute CHF. For a fib rvr, metoprolol home dose of 50 mg bid was continued and started on cardizem and discontinued norvasc. The HR has stabilized.    Patient also had cellulitis of lower extremities and was managed with iv ancef following which she showed clinical improvement.    Patient has shown significant clinical improvement at this time. She   will be discharged home on prednisone taper and azithromycin - for asthma flare up.  Lasix  Dose was increased - for CHF. cardizem was started and metoprolol continued at discharge for rvr while lisinopril and norvasc were discontinued.. On oral keflex for cellulitis.         Consultations This Hospital Stay   RESPIRATORY CARE IP CONSULT  PHARMACY TO DOSE WARFARIN    Code Status   Full Code    Time Spent on this Encounter   I, Ashvin Chua MD, personally saw the patient today and spent greater than 30 minutes discharging this patient.       Ashvin Chua MD  [address] MEDICAL SURGICAL  [address] [place] [address]  Phone: [phone]  ______________________________________________________________________    Physical Exam   Vital Signs:                   Weight: 303 lbs 8 oz         General Appearance:  Presently appears comfortable sitting up in bed, able to speak full sentences  Respiratory: Normal respiratory effort, diminished breath sounds but improved air movement compared with yesterday, no crackles or  wheezing  Cardiovascular: Irregularly irregular heart rate and rhythm but not tachycardic, good radial pulse, brisk capillary refill, peripheral edema present in the lower legs left greater than right  GI: Morbidly obese abdomen, soft  Skin: No erythema of the left lower leg, purpuric area on the lateral and posterior left lower leg is fading          Primary Care Physician   Physician No Ref-Primary    Discharge Orders      INR    Check INR on 8/5/21 and call PCP with results, PCP should advise coumadin dose for h/o a fib     Basic metabolic panel     Reason for your hospital stay    sob     Follow-up and recommended labs and tests     Follow up with primary care provider, Physician No Ref-Primary, within 7 days for hospital follow- up.  The following labs/tests are recommended: INR, BMP.    Follow up with   Cardiology     As advised in  1-2 weeks     Activity    Your activity upon discharge: activity as tolerated     Full Code     Diet    Follow this diet upon discharge: Orders Placed This Encounter      2 Gram Sodium Diet       Significant Results and Procedures   Most Recent 3 CBC's:Recent Labs   Lab Test 07/31/21  1300   WBC 9.3   HGB 12.3   MCV 97        Most Recent 3 BMP's:Recent Labs   Lab Test 08/02/21  0554 08/01/21  1013 07/31/21  1300    134 139   POTASSIUM 4.1 3.7 4.0   CHLORIDE 102 101 106   CO2 27 25 27   BUN 31* 26 18   CR 1.17* 1.20* 1.06*   ANIONGAP 6 8 6   MARY 8.7 8.8 8.8   * 242* 112*       Discharge Medications   Discharge Medication List as of 8/3/2021  1:26 PM      START taking these medications    Details   acetaminophen (TYLENOL) 325 MG tablet Take 1 tablet (325 mg) by mouth every 4 hours as needed for mild pain or fever, Disp-30 tablet, R-0, E-Prescribe      azithromycin (ZITHROMAX) 250 MG tablet Take 2 tablets (500 mg) by mouth daily for 1 day, THEN 1 tablet (250 mg) daily for 4 days., Disp-6 tablet, R-0, E-Prescribe      cephALEXin (KEFLEX) 500 MG capsule Take 1  capsule (500 mg) by mouth 3 times daily for 6 days, Disp-18 capsule, R-0, E-Prescribe      predniSONE (DELTASONE) 10 MG tablet Take prednisone, 10 mg tab - 4 tabs for 3 days, then 3 tabs for 3 days, then 2 tabs for 3 days, then 1 tab for 3 days, then stop., Disp-30 tablet, R-0, E-Prescribe      diltiazem ER COATED BEADS (CARDIAZEM LA) 300 MG 24 hr tablet Take 1 tablet (300 mg) by mouth daily, Disp-30 tablet, R-1, E-Prescribe         CONTINUE these medications which have CHANGED    Details   furosemide (LASIX) 40 MG tablet Take 1 tablet (40 mg) by mouth 2 times daily, Disp-60 tablet, R-1, E-Prescribe      warfarin ANTICOAGULANT (COUMADIN) 2.5 MG tablet Take 1 tablet (2.5 mg) by mouth daily Check INR on 8/3/21 and call PCP with results, PCP should advise coumadin dose for h/o a fib, Disp-15 tablet, R-0, E-Prescribe         CONTINUE these medications which have NOT CHANGED    Details   aspirin 81 MG EC tablet Take 81 mg by mouth daily, Historical      atorvastatin (LIPITOR) 40 MG tablet Take 40 mg by mouth daily, Historical      calcium carbonate 600 mg-vitamin D 400 units (CALTRATE) 600-400 MG-UNIT per tablet Take 1 tablet by mouth daily, Historical      diphenhydrAMINE-acetaminophen (TYLENOL PM)  MG tablet Take 2 tablets by mouth nightly as needed for sleep, Historical      fluticasone (FLONASE) 50 MCG/ACT nasal spray Spray 2 sprays into both nostrils daily, Historical      fluticasone-salmeterol (ADVAIR) 100-50 MCG/DOSE inhaler Inhale 1 puff into the lungs 2 times daily, Historical      metoprolol tartrate (LOPRESSOR) 50 MG tablet Take 50 mg by mouth 2 times daily, Historical      multivitamin, therapeutic (THERA-VIT) TABS tablet Take 1 tablet by mouth daily, Historical      omeprazole (PRILOSEC) 20 MG DR capsule Take 20 mg by mouth daily, Historical      rOPINIRole (REQUIP) 3 MG tablet Take 3 mg by mouth At Bedtime, Historical      sennosides (SENOKOT) 8.6 MG tablet Take 2 tablets by mouth daily as needed for  constipation, Historical      vitamin B-12 (CYANOCOBALAMIN) 2500 MCG sublingual tablet Take 2,500 mcg by mouth daily, Historical         STOP taking these medications       amLODIPine (NORVASC) 5 MG tablet Comments:   Reason for Stopping:         diltiazem (CARDIZEM) 30 MG tablet Comments:   Reason for Stopping:         lisinopril (ZESTRIL) 40 MG tablet Comments:   Reason for Stopping:             Allergies   No Known Allergies

## 2021-08-16 NOTE — PROGRESS NOTES
"Nanette Edwards  Gender: female  : 1945  [address]  [place] [address] [phone] (home)     Medical Record: [id number]  Pharmacy: [id number] - [place], [place] - [address]  Primary Care Provider: No Ref-Primary, Physician    Parent's names are: Data Unavailable (mother) and Data Unavailable (father).      [facility]  2021     Discharge Phone Call:  Key Words/Key Times      Introduction - AIDET (Acknowledge, Introduce, Duration, Explanation)      Empathy-   We are calling to see how you are since your recent stay in the hospital?     Call back COMMENTS:       Clinical Questions -  (f/u appts, medication side effects/purpose, ability to care for self at home) \"For your safety, it is important to us that you understand the purpose and side effects of your medications, can you tell me what your new medications are?\"     Call back COMMENTS:       Staff Recognition -  We like to recognize staff and physicians who have done an excellent job.  Do you remember any people from your care team that you would like recognize?     Call back COMMENTS:       Very Good Care -  We want to provide very good care to all patients.  How was your care?     Call back COMMENTS:       Opportunities for Improvement -  Our goal is to be the best.  Do you have any suggestions for things that we could improve upon?     Call back COMMENTS:       Thank You           [facility]  2021     Discharge Phone Call:  Key Words/Key Times    First attempt at callback, no answer, no message left.     2nd callback attempt.  Unable to leave message, no personalized voicemail.  Malissa Mcintosh RN on 2021 at 4:26 PM   "